# Patient Record
Sex: FEMALE | Race: BLACK OR AFRICAN AMERICAN | NOT HISPANIC OR LATINO | Employment: OTHER | URBAN - METROPOLITAN AREA
[De-identification: names, ages, dates, MRNs, and addresses within clinical notes are randomized per-mention and may not be internally consistent; named-entity substitution may affect disease eponyms.]

---

## 2022-05-10 ENCOUNTER — HOSPITAL ENCOUNTER (INPATIENT)
Facility: HOSPITAL | Age: 77
LOS: 3 days | Discharge: HOME HEALTH | DRG: 291 | End: 2022-05-13
Attending: EMERGENCY MEDICINE | Admitting: HOSPITALIST
Payer: MEDICARE

## 2022-05-10 DIAGNOSIS — I50.9 CHF (CONGESTIVE HEART FAILURE): ICD-10-CM

## 2022-05-10 DIAGNOSIS — Z86.79 HISTORY OF CONGESTIVE HEART FAILURE: ICD-10-CM

## 2022-05-10 DIAGNOSIS — J81.0 ACUTE PULMONARY EDEMA: ICD-10-CM

## 2022-05-10 DIAGNOSIS — R06.02 SHORTNESS OF BREATH: ICD-10-CM

## 2022-05-10 DIAGNOSIS — R53.81 DEBILITY: ICD-10-CM

## 2022-05-10 DIAGNOSIS — R06.02 SOB (SHORTNESS OF BREATH): ICD-10-CM

## 2022-05-10 DIAGNOSIS — I50.43 ACUTE ON CHRONIC COMBINED SYSTOLIC AND DIASTOLIC CONGESTIVE HEART FAILURE: Primary | ICD-10-CM

## 2022-05-10 PROBLEM — D64.9 ANEMIA: Status: ACTIVE | Noted: 2022-05-10

## 2022-05-10 PROBLEM — N17.9 ACUTE RENAL FAILURE: Status: ACTIVE | Noted: 2022-05-10

## 2022-05-10 PROBLEM — D69.6 THROMBOCYTOPENIA: Status: ACTIVE | Noted: 2022-05-10

## 2022-05-10 LAB
ALBUMIN SERPL BCP-MCNC: 4.1 G/DL (ref 3.5–5.2)
ALP SERPL-CCNC: 87 U/L (ref 55–135)
ALT SERPL W/O P-5'-P-CCNC: 7 U/L (ref 10–44)
ANION GAP SERPL CALC-SCNC: 15 MMOL/L (ref 8–16)
AST SERPL-CCNC: 13 U/L (ref 10–40)
BACTERIA #/AREA URNS HPF: NORMAL /HPF
BASOPHILS # BLD AUTO: 0.02 K/UL (ref 0–0.2)
BASOPHILS NFR BLD: 0.4 % (ref 0–1.9)
BILIRUB SERPL-MCNC: 0.4 MG/DL (ref 0.1–1)
BILIRUB UR QL STRIP: NEGATIVE
BNP SERPL-MCNC: 780 PG/ML (ref 0–99)
BUN SERPL-MCNC: 42 MG/DL (ref 8–23)
CALCIUM SERPL-MCNC: 9.2 MG/DL (ref 8.7–10.5)
CHLORIDE SERPL-SCNC: 104 MMOL/L (ref 95–110)
CLARITY UR: CLEAR
CO2 SERPL-SCNC: 22 MMOL/L (ref 23–29)
COLOR UR: COLORLESS
CREAT SERPL-MCNC: 2.3 MG/DL (ref 0.5–1.4)
CTP QC/QA: YES
CTP QC/QA: YES
DIFFERENTIAL METHOD: ABNORMAL
EOSINOPHIL # BLD AUTO: 0.1 K/UL (ref 0–0.5)
EOSINOPHIL NFR BLD: 1.6 % (ref 0–8)
ERYTHROCYTE [DISTWIDTH] IN BLOOD BY AUTOMATED COUNT: 17.2 % (ref 11.5–14.5)
EST. GFR  (AFRICAN AMERICAN): 23 ML/MIN/1.73 M^2
EST. GFR  (NON AFRICAN AMERICAN): 20 ML/MIN/1.73 M^2
GLUCOSE SERPL-MCNC: 202 MG/DL (ref 70–110)
GLUCOSE UR QL STRIP: ABNORMAL
HCT VFR BLD AUTO: 35.2 % (ref 37–48.5)
HGB BLD-MCNC: 10.6 G/DL (ref 12–16)
HGB UR QL STRIP: NEGATIVE
IMM GRANULOCYTES # BLD AUTO: 0.02 K/UL (ref 0–0.04)
IMM GRANULOCYTES NFR BLD AUTO: 0.4 % (ref 0–0.5)
INR PPP: 1 (ref 0.8–1.2)
KETONES UR QL STRIP: NEGATIVE
LEUKOCYTE ESTERASE UR QL STRIP: NEGATIVE
LIPASE SERPL-CCNC: 78 U/L (ref 4–60)
LYMPHOCYTES # BLD AUTO: 2.1 K/UL (ref 1–4.8)
LYMPHOCYTES NFR BLD: 36.2 % (ref 18–48)
MAGNESIUM SERPL-MCNC: 2.1 MG/DL (ref 1.6–2.6)
MCH RBC QN AUTO: 26.6 PG (ref 27–31)
MCHC RBC AUTO-ENTMCNC: 30.1 G/DL (ref 32–36)
MCV RBC AUTO: 88 FL (ref 82–98)
MICROSCOPIC COMMENT: NORMAL
MONOCYTES # BLD AUTO: 0.5 K/UL (ref 0.3–1)
MONOCYTES NFR BLD: 8.1 % (ref 4–15)
NEUTROPHILS # BLD AUTO: 3 K/UL (ref 1.8–7.7)
NEUTROPHILS NFR BLD: 53.3 % (ref 38–73)
NITRITE UR QL STRIP: NEGATIVE
NRBC BLD-RTO: 0 /100 WBC
PH UR STRIP: 5 [PH] (ref 5–8)
PLATELET # BLD AUTO: 136 K/UL (ref 150–450)
PMV BLD AUTO: 13.8 FL (ref 9.2–12.9)
POC MOLECULAR INFLUENZA A AGN: NEGATIVE
POC MOLECULAR INFLUENZA B AGN: NEGATIVE
POTASSIUM SERPL-SCNC: 4.5 MMOL/L (ref 3.5–5.1)
PROT SERPL-MCNC: 7.9 G/DL (ref 6–8.4)
PROT UR QL STRIP: NEGATIVE
PROTHROMBIN TIME: 10.6 SEC (ref 9–12.5)
RBC # BLD AUTO: 3.98 M/UL (ref 4–5.4)
SARS-COV-2 RDRP RESP QL NAA+PROBE: NEGATIVE
SODIUM SERPL-SCNC: 141 MMOL/L (ref 136–145)
SP GR UR STRIP: 1.01 (ref 1–1.03)
TROPONIN I SERPL DL<=0.01 NG/ML-MCNC: 0.03 NG/ML (ref 0–0.03)
URN SPEC COLLECT METH UR: ABNORMAL
UROBILINOGEN UR STRIP-ACNC: NEGATIVE EU/DL
WBC # BLD AUTO: 5.66 K/UL (ref 3.9–12.7)
YEAST URNS QL MICRO: NORMAL

## 2022-05-10 PROCEDURE — 12000002 HC ACUTE/MED SURGE SEMI-PRIVATE ROOM

## 2022-05-10 PROCEDURE — 93010 EKG 12-LEAD: ICD-10-PCS | Mod: ,,, | Performed by: INTERNAL MEDICINE

## 2022-05-10 PROCEDURE — 63600175 PHARM REV CODE 636 W HCPCS: Performed by: EMERGENCY MEDICINE

## 2022-05-10 PROCEDURE — 84300 ASSAY OF URINE SODIUM: CPT | Performed by: EMERGENCY MEDICINE

## 2022-05-10 PROCEDURE — 83735 ASSAY OF MAGNESIUM: CPT | Performed by: EMERGENCY MEDICINE

## 2022-05-10 PROCEDURE — 99291 CRITICAL CARE FIRST HOUR: CPT

## 2022-05-10 PROCEDURE — 93005 ELECTROCARDIOGRAM TRACING: CPT

## 2022-05-10 PROCEDURE — 84540 ASSAY OF URINE/UREA-N: CPT | Performed by: EMERGENCY MEDICINE

## 2022-05-10 PROCEDURE — 85610 PROTHROMBIN TIME: CPT | Performed by: EMERGENCY MEDICINE

## 2022-05-10 PROCEDURE — 80053 COMPREHEN METABOLIC PANEL: CPT | Performed by: EMERGENCY MEDICINE

## 2022-05-10 PROCEDURE — 93010 ELECTROCARDIOGRAM REPORT: CPT | Mod: ,,, | Performed by: INTERNAL MEDICINE

## 2022-05-10 PROCEDURE — 81000 URINALYSIS NONAUTO W/SCOPE: CPT | Mod: 91 | Performed by: EMERGENCY MEDICINE

## 2022-05-10 PROCEDURE — 83935 ASSAY OF URINE OSMOLALITY: CPT | Performed by: EMERGENCY MEDICINE

## 2022-05-10 PROCEDURE — 84484 ASSAY OF TROPONIN QUANT: CPT | Performed by: EMERGENCY MEDICINE

## 2022-05-10 PROCEDURE — 83880 ASSAY OF NATRIURETIC PEPTIDE: CPT | Performed by: EMERGENCY MEDICINE

## 2022-05-10 PROCEDURE — 81000 URINALYSIS NONAUTO W/SCOPE: CPT | Performed by: EMERGENCY MEDICINE

## 2022-05-10 PROCEDURE — 83930 ASSAY OF BLOOD OSMOLALITY: CPT | Performed by: EMERGENCY MEDICINE

## 2022-05-10 PROCEDURE — 83690 ASSAY OF LIPASE: CPT | Performed by: EMERGENCY MEDICINE

## 2022-05-10 PROCEDURE — 82570 ASSAY OF URINE CREATININE: CPT | Performed by: EMERGENCY MEDICINE

## 2022-05-10 PROCEDURE — 83036 HEMOGLOBIN GLYCOSYLATED A1C: CPT | Performed by: EMERGENCY MEDICINE

## 2022-05-10 PROCEDURE — U0002 COVID-19 LAB TEST NON-CDC: HCPCS | Performed by: EMERGENCY MEDICINE

## 2022-05-10 PROCEDURE — 85025 COMPLETE CBC W/AUTO DIFF WBC: CPT | Performed by: EMERGENCY MEDICINE

## 2022-05-10 RX ORDER — ALLOPURINOL 300 MG/1
300 TABLET ORAL DAILY
COMMUNITY

## 2022-05-10 RX ORDER — FUROSEMIDE 10 MG/ML
40 INJECTION INTRAMUSCULAR; INTRAVENOUS
Status: DISCONTINUED | OUTPATIENT
Start: 2022-05-11 | End: 2022-05-10

## 2022-05-10 RX ORDER — PANTOPRAZOLE SODIUM 40 MG/1
40 TABLET, DELAYED RELEASE ORAL NIGHTLY
COMMUNITY

## 2022-05-10 RX ORDER — FUROSEMIDE 40 MG/1
60 TABLET ORAL DAILY
COMMUNITY

## 2022-05-10 RX ORDER — FUROSEMIDE 10 MG/ML
60 INJECTION INTRAMUSCULAR; INTRAVENOUS
Status: DISCONTINUED | OUTPATIENT
Start: 2022-05-11 | End: 2022-05-11

## 2022-05-10 RX ORDER — DAPAGLIFLOZIN 10 MG/1
10 TABLET, FILM COATED ORAL DAILY
Status: ON HOLD | COMMUNITY
End: 2022-05-13 | Stop reason: HOSPADM

## 2022-05-10 RX ORDER — LOPERAMIDE HYDROCHLORIDE 2 MG/1
2 CAPSULE ORAL 4 TIMES DAILY PRN
COMMUNITY

## 2022-05-10 RX ORDER — TRAZODONE HYDROCHLORIDE 50 MG/1
50 TABLET ORAL NIGHTLY
COMMUNITY

## 2022-05-10 RX ORDER — ATORVASTATIN CALCIUM 20 MG/1
20 TABLET, FILM COATED ORAL NIGHTLY
COMMUNITY

## 2022-05-10 RX ORDER — VITAMIN E 268 MG
400 CAPSULE ORAL DAILY
COMMUNITY

## 2022-05-10 RX ORDER — ENOXAPARIN SODIUM 100 MG/ML
30 INJECTION SUBCUTANEOUS
Status: DISCONTINUED | OUTPATIENT
Start: 2022-05-11 | End: 2022-05-13 | Stop reason: HOSPADM

## 2022-05-10 RX ORDER — PANTOPRAZOLE SODIUM 40 MG/10ML
40 INJECTION, POWDER, LYOPHILIZED, FOR SOLUTION INTRAVENOUS DAILY
Status: DISCONTINUED | OUTPATIENT
Start: 2022-05-11 | End: 2022-05-13 | Stop reason: HOSPADM

## 2022-05-10 RX ORDER — SODIUM CHLORIDE 0.9 % (FLUSH) 0.9 %
10 SYRINGE (ML) INJECTION
Status: DISCONTINUED | OUTPATIENT
Start: 2022-05-11 | End: 2022-05-13 | Stop reason: HOSPADM

## 2022-05-10 RX ORDER — ASPIRIN 81 MG/1
81 TABLET ORAL DAILY
COMMUNITY

## 2022-05-10 RX ORDER — LINAGLIPTIN 5 MG/1
5 TABLET, FILM COATED ORAL EVERY MORNING
COMMUNITY

## 2022-05-10 RX ORDER — TRAMADOL HYDROCHLORIDE 50 MG/1
50 TABLET ORAL EVERY 6 HOURS PRN
Status: DISCONTINUED | OUTPATIENT
Start: 2022-05-11 | End: 2022-05-13 | Stop reason: HOSPADM

## 2022-05-10 RX ORDER — CARVEDILOL 3.12 MG/1
3.12 TABLET ORAL 2 TIMES DAILY WITH MEALS
COMMUNITY

## 2022-05-10 RX ORDER — ASPIRIN 81 MG/1
81 TABLET ORAL DAILY
Status: DISCONTINUED | OUTPATIENT
Start: 2022-05-11 | End: 2022-05-11

## 2022-05-10 RX ORDER — LANOLIN ALCOHOL/MO/W.PET/CERES
400 CREAM (GRAM) TOPICAL DAILY
COMMUNITY

## 2022-05-10 RX ORDER — FUROSEMIDE 10 MG/ML
80 INJECTION INTRAMUSCULAR; INTRAVENOUS
Status: COMPLETED | OUTPATIENT
Start: 2022-05-10 | End: 2022-05-10

## 2022-05-10 RX ORDER — SACUBITRIL AND VALSARTAN 24; 26 MG/1; MG/1
1 TABLET, FILM COATED ORAL DAILY
Status: ON HOLD | COMMUNITY
End: 2022-05-13 | Stop reason: HOSPADM

## 2022-05-10 RX ORDER — TRAMADOL HYDROCHLORIDE 50 MG/1
50 TABLET ORAL EVERY 6 HOURS PRN
COMMUNITY

## 2022-05-10 RX ORDER — CARVEDILOL 3.12 MG/1
3.12 TABLET ORAL 2 TIMES DAILY WITH MEALS
Status: DISCONTINUED | OUTPATIENT
Start: 2022-05-11 | End: 2022-05-13 | Stop reason: HOSPADM

## 2022-05-10 RX ORDER — TRAZODONE HYDROCHLORIDE 50 MG/1
50 TABLET ORAL NIGHTLY
Status: DISCONTINUED | OUTPATIENT
Start: 2022-05-11 | End: 2022-05-13 | Stop reason: HOSPADM

## 2022-05-10 RX ORDER — MORPHINE SULFATE 4 MG/ML
4 INJECTION, SOLUTION INTRAMUSCULAR; INTRAVENOUS EVERY 4 HOURS PRN
Status: DISCONTINUED | OUTPATIENT
Start: 2022-05-11 | End: 2022-05-13 | Stop reason: HOSPADM

## 2022-05-10 RX ADMIN — FUROSEMIDE 80 MG: 10 INJECTION, SOLUTION INTRAMUSCULAR; INTRAVENOUS at 09:05

## 2022-05-11 PROBLEM — R53.81 DEBILITY: Status: ACTIVE | Noted: 2022-05-11

## 2022-05-11 PROBLEM — Z79.4 TYPE 2 DIABETES MELLITUS WITH KIDNEY COMPLICATION, WITH LONG-TERM CURRENT USE OF INSULIN: Status: ACTIVE | Noted: 2022-05-11

## 2022-05-11 PROBLEM — E66.9 CLASS 1 OBESITY IN ADULT: Status: ACTIVE | Noted: 2022-05-11

## 2022-05-11 PROBLEM — E11.29 TYPE 2 DIABETES MELLITUS WITH KIDNEY COMPLICATION, WITH LONG-TERM CURRENT USE OF INSULIN: Status: ACTIVE | Noted: 2022-05-11

## 2022-05-11 PROBLEM — R79.89 TROPONIN LEVEL ELEVATED: Status: ACTIVE | Noted: 2022-05-11

## 2022-05-11 PROBLEM — J96.91 RESPIRATORY FAILURE WITH HYPOXIA: Status: ACTIVE | Noted: 2022-05-11

## 2022-05-11 PROBLEM — I10 ESSENTIAL HYPERTENSION: Status: ACTIVE | Noted: 2022-05-11

## 2022-05-11 LAB
ANION GAP SERPL CALC-SCNC: 10 MMOL/L (ref 8–16)
AORTIC ROOT ANNULUS: 3.19 CM
AORTIC VALVE CUSP SEPERATION: 1.56 CM
ASCENDING AORTA: 3.14 CM
AV INDEX (PROSTH): 0.39
AV MEAN GRADIENT: 5 MMHG
AV PEAK GRADIENT: 7 MMHG
AV VALVE AREA: 1.47 CM2
AV VELOCITY RATIO: 0.43
BACTERIA #/AREA URNS HPF: NORMAL /HPF
BASOPHILS # BLD AUTO: 0.02 K/UL (ref 0–0.2)
BASOPHILS NFR BLD: 0.3 % (ref 0–1.9)
BILIRUB UR QL STRIP: NEGATIVE
BSA FOR ECHO PROCEDURE: 2.01 M2
BUN SERPL-MCNC: 41 MG/DL (ref 8–23)
CALCIUM SERPL-MCNC: 9.4 MG/DL (ref 8.7–10.5)
CHLORIDE SERPL-SCNC: 104 MMOL/L (ref 95–110)
CLARITY UR: CLEAR
CO2 SERPL-SCNC: 30 MMOL/L (ref 23–29)
COLOR UR: COLORLESS
CREAT SERPL-MCNC: 2.2 MG/DL (ref 0.5–1.4)
CREAT UR-MCNC: 11.3 MG/DL (ref 15–325)
CV ECHO LV RWT: 0.48 CM
DIFFERENTIAL METHOD: ABNORMAL
DOP CALC AO PEAK VEL: 1.33 M/S
DOP CALC AO VTI: 25.43 CM
DOP CALC LVOT AREA: 3.8 CM2
DOP CALC LVOT DIAMETER: 2.19 CM
DOP CALC LVOT PEAK VEL: 0.57 M/S
DOP CALC LVOT STROKE VOLUME: 37.27 CM3
DOP CALCLVOT PEAK VEL VTI: 9.9 CM
E WAVE DECELERATION TIME: 175.44 MSEC
E/A RATIO: 0.71
E/E' RATIO: 24.29 M/S
ECHO LV POSTERIOR WALL: 1.4 CM (ref 0.6–1.1)
EJECTION FRACTION: 40 %
EOSINOPHIL # BLD AUTO: 0 K/UL (ref 0–0.5)
EOSINOPHIL NFR BLD: 0.3 % (ref 0–8)
EOSINOPHIL URNS QL WRIGHT STN: ABNORMAL
ERYTHROCYTE [DISTWIDTH] IN BLOOD BY AUTOMATED COUNT: 17 % (ref 11.5–14.5)
EST. GFR  (AFRICAN AMERICAN): 25 ML/MIN/1.73 M^2
EST. GFR  (NON AFRICAN AMERICAN): 21 ML/MIN/1.73 M^2
ESTIMATED AVG GLUCOSE: 151 MG/DL (ref 68–131)
FERRITIN SERPL-MCNC: 46 NG/ML (ref 20–300)
FRACTIONAL SHORTENING: 10 % (ref 28–44)
GLUCOSE SERPL-MCNC: 133 MG/DL (ref 70–110)
GLUCOSE UR QL STRIP: ABNORMAL
HBA1C MFR BLD: 6.9 % (ref 4–5.6)
HCT VFR BLD AUTO: 33.7 % (ref 37–48.5)
HGB BLD-MCNC: 10.2 G/DL (ref 12–16)
HGB UR QL STRIP: NEGATIVE
HYALINE CASTS #/AREA URNS LPF: 1 /LPF
IMM GRANULOCYTES # BLD AUTO: 0.03 K/UL (ref 0–0.04)
IMM GRANULOCYTES NFR BLD AUTO: 0.5 % (ref 0–0.5)
INTERVENTRICULAR SEPTUM: 1.43 CM (ref 0.6–1.1)
IRON SERPL-MCNC: 59 UG/DL (ref 30–160)
IVRT: 129.4 MSEC
KETONES UR QL STRIP: NEGATIVE
LA MAJOR: 6.44 CM
LA MINOR: 6.34 CM
LA WIDTH: 5.02 CM
LEFT ATRIUM SIZE: 3.96 CM
LEFT ATRIUM VOLUME INDEX: 55.1 ML/M2
LEFT ATRIUM VOLUME: 107.97 CM3
LEFT INTERNAL DIMENSION IN SYSTOLE: 5.27 CM (ref 2.1–4)
LEFT VENTRICLE DIASTOLIC VOLUME INDEX: 87.56 ML/M2
LEFT VENTRICLE DIASTOLIC VOLUME: 171.61 ML
LEFT VENTRICLE MASS INDEX: 195 G/M2
LEFT VENTRICLE SYSTOLIC VOLUME INDEX: 68.1 ML/M2
LEFT VENTRICLE SYSTOLIC VOLUME: 133.52 ML
LEFT VENTRICULAR INTERNAL DIMENSION IN DIASTOLE: 5.88 CM (ref 3.5–6)
LEFT VENTRICULAR MASS: 381.22 G
LEUKOCYTE ESTERASE UR QL STRIP: NEGATIVE
LV LATERAL E/E' RATIO: 21.25 M/S
LV SEPTAL E/E' RATIO: 28.33 M/S
LYMPHOCYTES # BLD AUTO: 1.5 K/UL (ref 1–4.8)
LYMPHOCYTES NFR BLD: 26 % (ref 18–48)
MCH RBC QN AUTO: 26.8 PG (ref 27–31)
MCHC RBC AUTO-ENTMCNC: 30.3 G/DL (ref 32–36)
MCV RBC AUTO: 89 FL (ref 82–98)
MICROSCOPIC COMMENT: NORMAL
MONOCYTES # BLD AUTO: 0.6 K/UL (ref 0.3–1)
MONOCYTES NFR BLD: 9.6 % (ref 4–15)
MV PEAK A VEL: 1.2 M/S
MV PEAK E VEL: 0.85 M/S
MV STENOSIS PRESSURE HALF TIME: 50.88 MS
MV VALVE AREA P 1/2 METHOD: 4.32 CM2
NEUTROPHILS # BLD AUTO: 3.6 K/UL (ref 1.8–7.7)
NEUTROPHILS NFR BLD: 63.3 % (ref 38–73)
NITRITE UR QL STRIP: NEGATIVE
NRBC BLD-RTO: 0 /100 WBC
OSMOLALITY SERPL: 323 MOSM/KG (ref 275–295)
OSMOLALITY UR: 311 MOSM/KG (ref 50–1200)
PH UR STRIP: 7 [PH] (ref 5–8)
PISA TR MAX VEL: 2.42 M/S
PLATELET # BLD AUTO: 145 K/UL (ref 150–450)
PMV BLD AUTO: 13 FL (ref 9.2–12.9)
POCT GLUCOSE: 126 MG/DL (ref 70–110)
POCT GLUCOSE: 141 MG/DL (ref 70–110)
POCT GLUCOSE: 153 MG/DL (ref 70–110)
POCT GLUCOSE: 160 MG/DL (ref 70–110)
POTASSIUM SERPL-SCNC: 4.6 MMOL/L (ref 3.5–5.1)
PROT UR QL STRIP: NEGATIVE
PV PEAK VELOCITY: 1.01 CM/S
RA MAJOR: 5.38 CM
RA PRESSURE: 3 MMHG
RA WIDTH: 4.66 CM
RBC # BLD AUTO: 3.8 M/UL (ref 4–5.4)
RIGHT VENTRICULAR END-DIASTOLIC DIMENSION: 3.79 CM
RV TISSUE DOPPLER FREE WALL SYSTOLIC VELOCITY 1 (APICAL 4 CHAMBER VIEW): 10.99 CM/S
SATURATED IRON: 17 % (ref 20–50)
SINUS: 3.5 CM
SODIUM SERPL-SCNC: 144 MMOL/L (ref 136–145)
SODIUM UR-SCNC: 118 MMOL/L (ref 20–250)
SP GR UR STRIP: 1 (ref 1–1.03)
STJ: 3.25 CM
TDI LATERAL: 0.04 M/S
TDI SEPTAL: 0.03 M/S
TDI: 0.04 M/S
TOTAL IRON BINDING CAPACITY: 345 UG/DL (ref 250–450)
TR MAX PG: 23 MMHG
TRANSFERRIN SERPL-MCNC: 233 MG/DL (ref 200–375)
TRICUSPID ANNULAR PLANE SYSTOLIC EXCURSION: 2.4 CM
TROPONIN I SERPL DL<=0.01 NG/ML-MCNC: 0.09 NG/ML (ref 0–0.03)
TROPONIN I SERPL DL<=0.01 NG/ML-MCNC: 0.1 NG/ML (ref 0–0.03)
TV REST PULMONARY ARTERY PRESSURE: 26 MMHG
URN SPEC COLLECT METH UR: ABNORMAL
UROBILINOGEN UR STRIP-ACNC: NEGATIVE EU/DL
UUN UR-MCNC: 99 MG/DL (ref 140–1050)
WBC # BLD AUTO: 5.72 K/UL (ref 3.9–12.7)
YEAST URNS QL MICRO: NORMAL

## 2022-05-11 PROCEDURE — 94760 N-INVAS EAR/PLS OXIMETRY 1: CPT

## 2022-05-11 PROCEDURE — 21400001 HC TELEMETRY ROOM

## 2022-05-11 PROCEDURE — 87205 SMEAR GRAM STAIN: CPT | Performed by: HOSPITALIST

## 2022-05-11 PROCEDURE — 84484 ASSAY OF TROPONIN QUANT: CPT | Mod: 91 | Performed by: HOSPITALIST

## 2022-05-11 PROCEDURE — 25000003 PHARM REV CODE 250: Performed by: EMERGENCY MEDICINE

## 2022-05-11 PROCEDURE — 27000190 HC CPAP FULL FACE MASK W/VALVE

## 2022-05-11 PROCEDURE — 63600175 PHARM REV CODE 636 W HCPCS: Performed by: EMERGENCY MEDICINE

## 2022-05-11 PROCEDURE — 94660 CPAP INITIATION&MGMT: CPT

## 2022-05-11 PROCEDURE — 85025 COMPLETE CBC W/AUTO DIFF WBC: CPT | Performed by: EMERGENCY MEDICINE

## 2022-05-11 PROCEDURE — 84466 ASSAY OF TRANSFERRIN: CPT | Performed by: HOSPITALIST

## 2022-05-11 PROCEDURE — 25000003 PHARM REV CODE 250: Performed by: HOSPITALIST

## 2022-05-11 PROCEDURE — 80048 BASIC METABOLIC PNL TOTAL CA: CPT | Performed by: EMERGENCY MEDICINE

## 2022-05-11 PROCEDURE — 36415 COLL VENOUS BLD VENIPUNCTURE: CPT | Performed by: EMERGENCY MEDICINE

## 2022-05-11 PROCEDURE — 82728 ASSAY OF FERRITIN: CPT | Performed by: HOSPITALIST

## 2022-05-11 PROCEDURE — C9113 INJ PANTOPRAZOLE SODIUM, VIA: HCPCS | Performed by: EMERGENCY MEDICINE

## 2022-05-11 PROCEDURE — 27000221 HC OXYGEN, UP TO 24 HOURS

## 2022-05-11 PROCEDURE — 63600175 PHARM REV CODE 636 W HCPCS: Performed by: HOSPITALIST

## 2022-05-11 PROCEDURE — 84484 ASSAY OF TROPONIN QUANT: CPT | Performed by: EMERGENCY MEDICINE

## 2022-05-11 PROCEDURE — 99900035 HC TECH TIME PER 15 MIN (STAT)

## 2022-05-11 RX ORDER — LOPERAMIDE HYDROCHLORIDE 2 MG/1
2 CAPSULE ORAL 4 TIMES DAILY PRN
Status: DISCONTINUED | OUTPATIENT
Start: 2022-05-11 | End: 2022-05-13 | Stop reason: HOSPADM

## 2022-05-11 RX ORDER — ATORVASTATIN CALCIUM 10 MG/1
20 TABLET, FILM COATED ORAL NIGHTLY
Status: DISCONTINUED | OUTPATIENT
Start: 2022-05-11 | End: 2022-05-13 | Stop reason: HOSPADM

## 2022-05-11 RX ORDER — TALC
6 POWDER (GRAM) TOPICAL NIGHTLY PRN
Status: DISCONTINUED | OUTPATIENT
Start: 2022-05-11 | End: 2022-05-13 | Stop reason: HOSPADM

## 2022-05-11 RX ORDER — FUROSEMIDE 10 MG/ML
60 INJECTION INTRAMUSCULAR; INTRAVENOUS
Status: DISCONTINUED | OUTPATIENT
Start: 2022-05-11 | End: 2022-05-12

## 2022-05-11 RX ORDER — ACETAMINOPHEN 325 MG/1
650 TABLET ORAL EVERY 6 HOURS PRN
Status: DISCONTINUED | OUTPATIENT
Start: 2022-05-11 | End: 2022-05-13 | Stop reason: HOSPADM

## 2022-05-11 RX ORDER — IBUPROFEN 200 MG
16 TABLET ORAL
Status: DISCONTINUED | OUTPATIENT
Start: 2022-05-11 | End: 2022-05-13 | Stop reason: HOSPADM

## 2022-05-11 RX ORDER — INSULIN ASPART 100 [IU]/ML
0-5 INJECTION, SOLUTION INTRAVENOUS; SUBCUTANEOUS
Status: DISCONTINUED | OUTPATIENT
Start: 2022-05-11 | End: 2022-05-13 | Stop reason: HOSPADM

## 2022-05-11 RX ORDER — MUPIROCIN 20 MG/G
OINTMENT TOPICAL 2 TIMES DAILY
Status: DISCONTINUED | OUTPATIENT
Start: 2022-05-11 | End: 2022-05-13 | Stop reason: HOSPADM

## 2022-05-11 RX ORDER — NITROGLYCERIN 0.4 MG/1
0.4 TABLET SUBLINGUAL EVERY 5 MIN PRN
Status: DISCONTINUED | OUTPATIENT
Start: 2022-05-11 | End: 2022-05-13 | Stop reason: HOSPADM

## 2022-05-11 RX ORDER — ONDANSETRON 2 MG/ML
4 INJECTION INTRAMUSCULAR; INTRAVENOUS EVERY 4 HOURS PRN
Status: DISCONTINUED | OUTPATIENT
Start: 2022-05-11 | End: 2022-05-13 | Stop reason: HOSPADM

## 2022-05-11 RX ORDER — GLUCAGON 1 MG
1 KIT INJECTION
Status: DISCONTINUED | OUTPATIENT
Start: 2022-05-11 | End: 2022-05-13 | Stop reason: HOSPADM

## 2022-05-11 RX ORDER — ASPIRIN 325 MG
325 TABLET, DELAYED RELEASE (ENTERIC COATED) ORAL DAILY
Status: DISCONTINUED | OUTPATIENT
Start: 2022-05-11 | End: 2022-05-13 | Stop reason: HOSPADM

## 2022-05-11 RX ORDER — IBUPROFEN 200 MG
24 TABLET ORAL
Status: DISCONTINUED | OUTPATIENT
Start: 2022-05-11 | End: 2022-05-13 | Stop reason: HOSPADM

## 2022-05-11 RX ADMIN — ATORVASTATIN CALCIUM 20 MG: 10 TABLET, FILM COATED ORAL at 08:05

## 2022-05-11 RX ADMIN — CARVEDILOL 3.12 MG: 3.12 TABLET, FILM COATED ORAL at 09:05

## 2022-05-11 RX ADMIN — ENOXAPARIN SODIUM 30 MG: 30 INJECTION SUBCUTANEOUS at 09:05

## 2022-05-11 RX ADMIN — FUROSEMIDE 60 MG: 10 INJECTION, SOLUTION INTRAMUSCULAR; INTRAVENOUS at 06:05

## 2022-05-11 RX ADMIN — NITROGLYCERIN 1 INCH: 20 OINTMENT TOPICAL at 06:05

## 2022-05-11 RX ADMIN — Medication 6 MG: at 09:05

## 2022-05-11 RX ADMIN — ASPIRIN 325 MG: 325 TABLET, COATED ORAL at 09:05

## 2022-05-11 RX ADMIN — CARVEDILOL 3.12 MG: 3.12 TABLET, FILM COATED ORAL at 05:05

## 2022-05-11 RX ADMIN — NITROGLYCERIN 1 INCH: 20 OINTMENT TOPICAL at 12:05

## 2022-05-11 RX ADMIN — TRAZODONE HYDROCHLORIDE 50 MG: 50 TABLET ORAL at 08:05

## 2022-05-11 RX ADMIN — PANTOPRAZOLE SODIUM 40 MG: 40 INJECTION, POWDER, FOR SOLUTION INTRAVENOUS at 09:05

## 2022-05-11 RX ADMIN — FUROSEMIDE 60 MG: 10 INJECTION, SOLUTION INTRAMUSCULAR; INTRAVENOUS at 05:05

## 2022-05-11 RX ADMIN — MUPIROCIN: 20 OINTMENT TOPICAL at 08:05

## 2022-05-11 NOTE — ASSESSMENT & PLAN NOTE
-Check A1c  -Home med list includes dapaglifozin, aspart insulin, detemir insulin and tradjenta.  -Treat with ssi ac/hs for now.

## 2022-05-11 NOTE — ASSESSMENT & PLAN NOTE
-Hb 10.6 on admit and normocytic  -No bleeding  -Check iron, ferritin, b12 and folate  -Repeat cbc in AM

## 2022-05-11 NOTE — PLAN OF CARE
[facility] - Telemetry  Initial Discharge Assessment       Primary Care Provider: Angel Lopez MD    Admission Diagnosis: Shortness of breath [R06.02]  Acute pulmonary edema [J81.0]  SOB (shortness of breath) [R06.02]  History of congestive heart failure [Z86.79]    Admission Date: 5/10/2022  Expected Discharge Date:     Discharge Barriers Identified: None    Payor: MEDICARE / Plan: MEDICARE PART A & B / Product Type: Government /     Extended Emergency Contact Information  Primary Emergency Contact: Martin Guerin   [facility]  Home Phone: [phone]  Relation: Son  Secondary Emergency Contact: TruongSarahi  Mobile Phone: [phone]  Relation: Daughter   needed? No    Discharge Plan A: Home with family  Discharge Plan B: Other      [id number] - [place], [place] - [address]  [address]  [place] [address]  Phone: [phone] Fax: [phone]      Initial Assessment (most recent)       Adult Discharge Assessment - 05/11/22 1527          Discharge Assessment    Assessment Type Discharge Planning Assessment     Confirmed/corrected address, phone number and insurance Yes     Confirmed Demographics Correct on Facesheet     Source of Information patient     If unable to respond/provide information was family/caregiver contacted? No Contact Information Available     When was your last doctors appointment? --   Patient stated 2 months ago.    Communicated TRACEY with patient/caregiver Date not available/Unable to determine     Reason For Admission Shortness of Breath     Lives With alone;child(keanu), adult     Facility Arrived From: Home     Do you expect to return to your current living situation? Yes     Do you have help at home or someone to help you manage your care at home? No     Prior to hospitilization cognitive status: Alert/Oriented     Current cognitive status: Alert/Oriented     Equipment Currently  Used at Home oxygen;nebulizer;cane, straight     Readmission within 30 days? No     Patient currently being followed by outpatient case management? No     Do you currently have service(s) that help you manage your care at home? No     Do you take prescription medications? Yes     Do you have prescription coverage? Yes     Coverage Medicare and Medicaid     Do you have any problems affording any of your prescribed medications? No     Is the patient taking medications as prescribed? yes     Who is going to help you get home at discharge? Martin Guerin (Son)   [phone]     How do you get to doctors appointments? family or friend will provide     Are you on dialysis? No     Do you take coumadin? No     Discharge Plan A Home with family     Discharge Plan B Other     DME Needed Upon Discharge  other (see comments)   TBD    Discharge Plan discussed with: Patient     Discharge Barriers Identified None        Relationship/Environment    Name(s) of Who Lives With Patient Martin Guerin (Son)   [phone]                     Patient stated her appointments have to be worked around her daughter's work schedule.

## 2022-05-11 NOTE — ED PROVIDER NOTES
"Encounter Date: 5/10/2022       History     Chief Complaint   Patient presents with    Shortness of Breath     Pt. Arrrived via EMS from home with C/C SOB x30 minutes.  Pt. Has Hx of COPD & CHF, Is speaking in complete sentences and denies pain at triage. States she has occasional "Nausea".  Has N/C @ 2lpm 100% Spo2.     HPI   This 76-year-old white female presents to the emergency room with a history of congestive heart failure and COPD.  The patient denies chest pain pressure tightness.  She has had some cough.  There is no sputum production or fever chills.  She has generalized abdominal pain.  She denies painful urination vomiting or diarrhea.  All of her symptoms started at about 6:00 p.m..  She is visiting from [place].  Review of patient's allergies indicates:  No Known Allergies  No past medical history on file.  No past surgical history on file.  No family history on file.     Review of Systems  The patient was questioned specifically with regard to the following.  General: Fever, chills, sweats. Neuro: Headache. Eyes: eye problems. ENT: Ear pain, sore throat. Cardiovascular: Chest pain. Respiratory: Cough, shortness of breath. Gastrointestinal: Abdominal pain, vomiting, diarrhea. Genitourinary: Painful urination.  Musculoskeletal: Arm and leg problems. Skin: Rash.  The review of systems was negative except for the following:  Shortness of breath, cough, generalized abdominal pain.  No chest pain pressure tightness or fever.  Physical Exam     Initial Vitals [05/10/22 2043]   BP Pulse Resp Temp SpO2   118/72 102 20 98.9 °F (37.2 °C) 100 %      MAP       --         Physical Exam  The patient was examined specifically for the following:   General:No significant distress, Good color, Warm and dry. Head and neck:Scalp atraumatic, Neck supple. Neurological:Appropriate conversation, Gross motor deficits. Eyes:Conjugate gaze, Clear corneas. ENT: No epistaxis. Cardiac: Regular rate and rhythm, Grossly normal " heart tones. Pulmonary: Wheezing, Rales. Gastrointestinal: Abdominal tenderness, Abdominal distention. Musculoskeletal: Extremity deformity, Apparent pain with range of motion of the joints. Skin: Rash.   The findings on examination were normal except for the following:  The patient appears to be dyspneic.  Her heart rate is 102. Oxygen saturations are 100% on 100% non-rebreather.  The heart tones are normal the patient has regular rate rhythm.  The abdomen is soft.  ED Course   Critical Care    Date/Time: 5/10/2022 10:53 PM  Performed by: Yandel Anderson MD  Authorized by: Yandel Anderson MD   Direct patient critical care time: 22 minutes  Additional history critical care time: 11 minutes  Ordering / reviewing critical care time: 11 minutes  Documentation critical care time: 11 minutes  Consulting other physicians critical care time: 11 minutes  Total critical care time (exclusive of procedural time) : 66 minutes  Critical care time was exclusive of separately billable procedures and treating other patients and teaching time.  Critical care was necessary to treat or prevent imminent or life-threatening deterioration of the following conditions: respiratory failure and cardiac failure.  Critical care was time spent personally by me on the following activities: development of treatment plan with patient or surrogate, discussions with primary provider, evaluation of patient's response to treatment, examination of patient, obtaining history from patient or surrogate, ordering and performing treatments and interventions, ordering and review of laboratory studies, ordering and review of radiographic studies, pulse oximetry, re-evaluation of patient's condition and review of old charts.        Labs Reviewed   COMPREHENSIVE METABOLIC PANEL - Abnormal; Notable for the following components:       Result Value    CO2 22 (*)     Glucose 202 (*)     BUN 42 (*)     Creatinine 2.3 (*)     ALT 7 (*)     eGFR if   23 (*)     eGFR if non  20 (*)     All other components within normal limits   LIPASE - Abnormal; Notable for the following components:    Lipase 78 (*)     All other components within normal limits   TROPONIN I - Abnormal; Notable for the following components:    Troponin I 0.032 (*)     All other components within normal limits   B-TYPE NATRIURETIC PEPTIDE - Abnormal; Notable for the following components:     (*)     All other components within normal limits   CBC W/ AUTO DIFFERENTIAL - Abnormal; Notable for the following components:    RBC 3.98 (*)     Hemoglobin 10.6 (*)     Hematocrit 35.2 (*)     MCH 26.6 (*)     MCHC 30.1 (*)     RDW 17.2 (*)     Platelets 136 (*)     MPV 13.8 (*)     All other components within normal limits   URINALYSIS, REFLEX TO URINE CULTURE - Abnormal; Notable for the following components:    Color, UA Colorless (*)     Glucose, UA 4+ (*)     All other components within normal limits    Narrative:     Specimen Source->Urine   PROTIME-INR   URINALYSIS MICROSCOPIC    Narrative:     Specimen Source->Urine   SARS-COV-2 RDRP GENE   POCT INFLUENZA A/B MOLECULAR     EKG Readings: (Independently Interpreted)   This patient is in a sinus tachycardia with a heart rate of 113.  There are nonspecific ST segment and T-wave changes.  The patient has normal axis.  There are low voltage QRS complexes.       Imaging Results          X-Ray Chest AP Portable (Final result)  Result time 05/10/22 22:12:13    Final result by Maame Valverde MD (05/10/22 22:12:13)                 Impression:      Cardiomegaly.  Bilateral airspace opacities.  Findings may represent CHF and pulmonary edema.  Pneumonic infiltrates may have a similar appearance.  Recommend clinical correlation.      Electronically signed by: Maame Valverde  Date:    05/10/2022  Time:    22:12             Narrative:    EXAMINATION:  AP PORTABLE CHEST    CLINICAL HISTORY:  chest pain;    TECHNIQUE:  AP portable chest  radiograph was submitted.    COMPARISON:  11/26/2007    FINDINGS:  There is a left subclavian pacer.  AP portable chest radiograph demonstrates mild enlargement the cardiac silhouette.  There are bilateral airspace opacities.  There is no pneumothorax.  There is no significant pleural effusion detected.  There is no pneumothorax.                              Medical decision making:  Given the above this patient presents to emergency with shortness of breath.  The patient has a history of congestive heart failure.  Chest x-ray reveals changes consistent with acute pulmonary edema.  Patient is visiting from out of town we have limited information about the patient's baseline medical condition.  Her BUN is 40 year her creatinine is 2.3.  This is likely from diuretic therapy.  Patient has a borderline elevated troponin and BNP is 780. I believe this patient has acute pulmonary edema from congestive heart failure.  I believe she will benefit from diuresis.         Medications   nitroGLYCERIN 2% TD oint ointment 1 inch (has no administration in time range)   furosemide injection 40 mg (has no administration in time range)   furosemide injection 80 mg (80 mg Intravenous Given 5/10/22 8423)                          Clinical Impression:   Final diagnoses:  [R06.02] Shortness of breath  [J81.0] Acute pulmonary edema (Primary)  [Z86.79] History of congestive heart failure          ED Disposition Condition    Admit               Yandel Anderson MD  05/10/22 9691

## 2022-05-11 NOTE — ASSESSMENT & PLAN NOTE
-Patient with Hypoxic Respiratory failure which is Chronic.  She is on home oxygen at 2 LPM at night only.  -Signs/symptoms of respiratory failure include- tachypnea.   -Contributing diagnoses includes - CHF and COPD   -Labs and images were reviewed. Patient Has not had a recent ABG.   -Will treat underlying causes and adjust management of respiratory failure as follows-   -Treat CHF as above.  -Continue supplemental O2.

## 2022-05-11 NOTE — ED NOTES
Pt was 94% on 4L NC and working to breathe. She requested facemask. nonrebreather placed on pt and sat now 99%

## 2022-05-11 NOTE — PROGRESS NOTES
[facility] Medicine  Progress Note    Patient Name: Perry Guerin  MRN: [id number]  Patient Class: IP- Inpatient   Admission Date: 5/10/2022  Length of Stay: 1 days  Attending Physician: Yandel Aviles MD  Primary Care Provider: Primary Doctor No        Subjective:     Principal Problem:Acute CHF        HPI:  No notes on file    Overview/Hospital Course:  No notes on file    Interval History: No acute events overnight.  Feels much better.  Denies both chest and abdominal pain now.  Notes that primary pain was abdominal, intermittent and gassy.  Denies edema.  Notes that she uses nasal canula O2 2L only at night.  All questions answered and patient had no further complaints.    Review of Systems   Constitutional:  Negative for activity change, chills, diaphoresis and fatigue.   HENT:  Negative for congestion, drooling and hearing loss.    Eyes:  Negative for discharge.   Respiratory:  Positive for shortness of breath. Negative for apnea, chest tightness and wheezing.    Cardiovascular:  Negative for palpitations and leg swelling.   Gastrointestinal:  Positive for abdominal pain. Negative for abdominal distention, constipation and diarrhea.   Endocrine: Negative.    Genitourinary:  Negative for difficulty urinating.   Musculoskeletal:  Negative for arthralgias and gait problem.   Skin:  Negative for rash.   Neurological:  Positive for weakness. Negative for seizures, light-headedness and numbness.   Hematological:  Negative for adenopathy.   Psychiatric/Behavioral:  Negative for agitation and behavioral problems.    Objective:     Vital Signs (Most Recent):  Temp: 98.1 °F (36.7 °C) (05/11/22 1205)  Pulse: 83 (05/11/22 1205)  Resp: 18 (05/11/22 1205)  BP: 108/68 (05/11/22 1205)  SpO2: 100 % (05/11/22 1205) Vital Signs (24h Range):  Temp:  [98.1 °F (36.7 °C)-98.9 °F (37.2 °C)] 98.1 °F (36.7 °C)  Pulse:  [] 83  Resp:  [17-30] 18  SpO2:  [93 %-100 %] 100 %  BP: (108-138)/(55-85) 108/68      Weight: 87 kg (191 lb 12.8 oz)  Body mass index is 30.96 kg/m².    Intake/Output Summary (Last 24 hours) at 5/11/2022 1324  Last data filed at 5/11/2022 0715  Gross per 24 hour   Intake 240 ml   Output 2675 ml   Net -2435 ml      Physical Exam  Vitals and nursing note reviewed.   Constitutional:       General: She is not in acute distress.     Appearance: She is well-developed. She is not ill-appearing, toxic-appearing or diaphoretic.   HENT:      Head: Normocephalic and atraumatic.      Right Ear: External ear normal.      Left Ear: External ear normal.      Nose: Nose normal.      Mouth/Throat:      Mouth: Mucous membranes are moist.   Eyes:      Extraocular Movements: Extraocular movements intact.      Conjunctiva/sclera: Conjunctivae normal.   Cardiovascular:      Rate and Rhythm: Normal rate and regular rhythm.      Heart sounds: Normal heart sounds.      Comments: AICD noted in left chest  Pulmonary:      Effort: Pulmonary effort is normal. No respiratory distress.      Breath sounds: Normal breath sounds. No wheezing or rales.   Abdominal:      General: Bowel sounds are normal. There is no distension.      Palpations: Abdomen is soft.      Tenderness: There is no abdominal tenderness.   Musculoskeletal:         General: Normal range of motion.      Cervical back: Normal range of motion. No rigidity.      Right lower leg: No edema.      Left lower leg: No edema.   Skin:     General: Skin is warm and dry.   Neurological:      Mental Status: She is alert and oriented to person, place, and time.      Cranial Nerves: No cranial nerve deficit.      Coordination: Coordination normal.      Comments: Diffuse weakness - non-focal   Psychiatric:         Behavior: Behavior normal.       Significant Labs: All pertinent labs within the past 24 hours have been reviewed.    Significant Imaging: I have reviewed all pertinent imaging results/findings within the past 24 hours.      Assessment/Plan:      * Acute  CHF  -Admitted to inpatient status  -On admit noted elevated BNP and pulmonary edema on CXR with some shortness of breath  -BNP elevated at 780  -AICD in place - presumed to be systolic chf.  -Strict ins/outs and daily weights  -Await echo - being done today.  -Holding home entresto due to renal dysfunction  -Continue home coreg  -Diurese with IV lasix.    Acute renal failure  -Cr 2.23 on admit  -Baseline Cr unclear, but she does have a nephrologist in [place]  -Cr 1.03 12/2013 - last data available to me.  -Avoid nephrotoxic agents and renally dose meds  -IESHA without evidence of hydronephrosis.  -FEUrea 48% - suggesting intrinsic renal disease  -Holding home entresto for now.  -Fluid overloaded on admit - diuresing with IV lasix  -Arriaza placed in ER  -Repeat BMP in AM.    Troponin level elevated  -Troponin 0.03--0.10 --0.08  -Had chest vs abdominal pain prior to admit.  Patient reports it was much more abdominal pain to me.  Presently pain free with NTG patch in place.  -EKG showed no ST change and TWI in V5/V6 which were also present in 2007.  -Echo ordered - await results  -Suspect this was demand ischemia in setting of chf exacerbation and renal dysfunction (acute vs chronic?)    Thrombocytopenia  -Platelets 136 on admit and 145 now  -Unclear if new or chronic.  -Check folic acid and b12      Debility  -Consult PT/OT    Respiratory failure with hypoxia  -Patient with Hypoxic Respiratory failure which is Chronic.  She is on home oxygen at 2 LPM at night only.  -Signs/symptoms of respiratory failure include- tachypnea.   -Contributing diagnoses includes - CHF and COPD   -Labs and images were reviewed. Patient Has not had a recent ABG.   -Will treat underlying causes and adjust management of respiratory failure as follows-   -Treat CHF as above.  -Continue supplemental O2.    Class 1 obesity in adult  Body mass index is 30.96 kg/m². Morbid obesity complicates all aspects of disease management from diagnostic  modalities to treatment. Weight loss encouraged and health benefits explained to patient.    Essential hypertension  -Well controlled  -Continue home coreg  -Holding entresto due to renal dysfunction.    Type 2 diabetes mellitus with kidney complication, with long-term current use of insulin  -Check A1c  -Home med list includes dapaglifozin, aspart insulin, detemir insulin and tradjenta.  -Treat with ssi ac/hs for now.    Anemia  -Hb 10.6 on admit and normocytic  -No bleeding  -Check iron, ferritin, b12 and folate  -Repeat cbc in AM      VTE Risk Mitigation (From admission, onward)         Ordered     enoxaparin injection 30 mg  Every 24 hours (non-standard times)         05/10/22 2325     IP VTE HIGH RISK PATIENT  Once         05/10/22 2325     Place sequential compression device  Until discontinued         05/10/22 2325                Discharge Planning   TRACEY:      Code Status: Full Code   Is the patient medically ready for discharge?:     Reason for patient still in hospital (select all that apply): Treatment and PT / OT recommendations                     Yandel Aviles MD  Department of Hospital Medicine   [facility] - Telemetry

## 2022-05-11 NOTE — ASSESSMENT & PLAN NOTE
Body mass index is 30.96 kg/m². Morbid obesity complicates all aspects of disease management from diagnostic modalities to treatment. Weight loss encouraged and health benefits explained to patient.

## 2022-05-11 NOTE — ASSESSMENT & PLAN NOTE
-Cr 2.23 on admit  -Baseline Cr unclear, but she does have a nephrologist in [place]  -Cr 1.03 12/2013 - last data available to me.  -Avoid nephrotoxic agents and renally dose meds  -IESHA without evidence of hydronephrosis.  -FEUrea 48% - suggesting intrinsic renal disease  -Holding home entresto for now.  -Fluid overloaded on admit - diuresing with IV lasix  -Arriaza placed in ER  -Repeat BMP in AM.

## 2022-05-11 NOTE — PT/OT/SLP PROGRESS
Physical Therapy      Patient Name:  Perry Guerin   MRN:  [id number]    Patient not seen today secondary to Off the floor for procedure/surgery. Will follow-up .

## 2022-05-11 NOTE — ED NOTES
Pt to ED via EMS from home with c/o SOB while at rest x 1 hour. Hx of CHF.Denies n/v but reports some light-headedness

## 2022-05-11 NOTE — ASSESSMENT & PLAN NOTE
Check urine electrolytes. Diuresis  to augment the urine output and relieve symptoms. Monitor UOP, serum creatinine, electrolytes, albumin, and measures of fluid balance. Consider renal US and nephrology consult if no improvement. No need for HD at this time

## 2022-05-11 NOTE — ED NOTES
"Pt asked for tramadol on several occasions and when it was brought to her, she stated that she didn't hurt as much and wanted to "wait until I need it."  "

## 2022-05-11 NOTE — ASSESSMENT & PLAN NOTE
-Admitted to inpatient status  -On admit noted elevated BNP and pulmonary edema on CXR with some shortness of breath  -BNP elevated at 780  -AICD in place - presumed to be systolic chf.  -Strict ins/outs and daily weights  -Await echo - being done today.  -Holding home entresto due to renal dysfunction  -Continue home coreg  -Diurese with IV lasix.

## 2022-05-11 NOTE — SUBJECTIVE & OBJECTIVE
Interval History: No acute events overnight.  Feels much better.  Denies both chest and abdominal pain now.  Notes that primary pain was abdominal, intermittent and gassy.  Denies edema.  Notes that she uses nasal canula O2 2L only at night.  All questions answered and patient had no further complaints.    Review of Systems   Constitutional:  Negative for activity change, chills, diaphoresis and fatigue.   HENT:  Negative for congestion, drooling and hearing loss.    Eyes:  Negative for discharge.   Respiratory:  Positive for shortness of breath. Negative for apnea, chest tightness and wheezing.    Cardiovascular:  Negative for palpitations and leg swelling.   Gastrointestinal:  Positive for abdominal pain. Negative for abdominal distention, constipation and diarrhea.   Endocrine: Negative.    Genitourinary:  Negative for difficulty urinating.   Musculoskeletal:  Negative for arthralgias and gait problem.   Skin:  Negative for rash.   Neurological:  Positive for weakness. Negative for seizures, light-headedness and numbness.   Hematological:  Negative for adenopathy.   Psychiatric/Behavioral:  Negative for agitation and behavioral problems.    Objective:     Vital Signs (Most Recent):  Temp: 98.1 °F (36.7 °C) (05/11/22 1205)  Pulse: 83 (05/11/22 1205)  Resp: 18 (05/11/22 1205)  BP: 108/68 (05/11/22 1205)  SpO2: 100 % (05/11/22 1205) Vital Signs (24h Range):  Temp:  [98.1 °F (36.7 °C)-98.9 °F (37.2 °C)] 98.1 °F (36.7 °C)  Pulse:  [] 83  Resp:  [17-30] 18  SpO2:  [93 %-100 %] 100 %  BP: (108-138)/(55-85) 108/68     Weight: 87 kg (191 lb 12.8 oz)  Body mass index is 30.96 kg/m².    Intake/Output Summary (Last 24 hours) at 5/11/2022 1324  Last data filed at 5/11/2022 0715  Gross per 24 hour   Intake 240 ml   Output 2675 ml   Net -2435 ml      Physical Exam  Vitals and nursing note reviewed.   Constitutional:       General: She is not in acute distress.     Appearance: She is well-developed. She is not  ill-appearing, toxic-appearing or diaphoretic.   HENT:      Head: Normocephalic and atraumatic.      Right Ear: External ear normal.      Left Ear: External ear normal.      Nose: Nose normal.      Mouth/Throat:      Mouth: Mucous membranes are moist.   Eyes:      Extraocular Movements: Extraocular movements intact.      Conjunctiva/sclera: Conjunctivae normal.   Cardiovascular:      Rate and Rhythm: Normal rate and regular rhythm.      Heart sounds: Normal heart sounds.      Comments: AICD noted in left chest  Pulmonary:      Effort: Pulmonary effort is normal. No respiratory distress.      Breath sounds: Normal breath sounds. No wheezing or rales.   Abdominal:      General: Bowel sounds are normal. There is no distension.      Palpations: Abdomen is soft.      Tenderness: There is no abdominal tenderness.   Musculoskeletal:         General: Normal range of motion.      Cervical back: Normal range of motion. No rigidity.      Right lower leg: No edema.      Left lower leg: No edema.   Skin:     General: Skin is warm and dry.   Neurological:      Mental Status: She is alert and oriented to person, place, and time.      Cranial Nerves: No cranial nerve deficit.      Coordination: Coordination normal.      Comments: Diffuse weakness - non-focal   Psychiatric:         Behavior: Behavior normal.       Significant Labs: All pertinent labs within the past 24 hours have been reviewed.    Significant Imaging: I have reviewed all pertinent imaging results/findings within the past 24 hours.

## 2022-05-11 NOTE — H&P
"[facility] Emergency Dept  [facility] Medicine  History & Physical    Patient Name: Perry Guerin  MRN: [id number]  Patient Class: IP- Inpatient  Admission Date: 5/10/2022  Attending Physician: Yandel Aviles MD  Primary Care Provider: No primary care provider on file.         Patient information was obtained from past medical records and ER records. patient    Subjective:     Principal Problem:<principal problem not specified>    Chief Complaint:     Chief Complaint   Patient presents with    Shortness of Breath     Pt. Arrrived via EMS from home with C/C SOB x30 minutes.  Pt. Has Hx of COPD & CHF, Is speaking in complete sentences and denies pain at triage. States she has occasional "Nausea".  Has N/C @ 2lpm 100% Spo2.       HPI: 76 y.o. female PMHx CHF, COPD presents with SOB and chest pressure that worsened immediately PTA. States that symptoms feel tight. Symptoms associated with abdominal pain. Patient is visiting from out of town when she started to have symptoms st 6pm    ED course:  WBC normal. Hg 10 and Cr 2.3 up from 0.8 five years ago. Patient has elevated bnp (780) and tn (0.032). CXR shows evidence of pulm edema.  Admitted to Hospital Medicine for further evaluation    PMHx as abobe  No past surgical history on file.     No family history on file.  Review of patient's allergies indicates:  No Known Allergies       Medication List      ASK your doctor about these medications    allopurinoL 300 MG tablet  Commonly known as: ZYLOPRIM     aspirin 81 MG EC tablet  Commonly known as: ECOTRIN     atorvastatin 20 MG tablet  Commonly known as: LIPITOR     carvediloL 3.125 MG tablet  Commonly known as: COREG     ENTRESTO 24-26 mg per tablet  Generic drug: sacubitriL-valsartan     FARXIGA 10 mg tablet  Generic drug: dapagliflozin     furosemide 40 MG tablet  Commonly known as: LASIX     insulin detemir U-100 100 unit/mL injection  Commonly known as: Levemir     loperamide 2 mg capsule  Commonly known as: IMODIUM   " "  magnesium oxide 400 mg (241.3 mg magnesium) tablet  Commonly known as: MAG-OX     NOVOLOG FLEXPEN U-100 INSULIN SUBQ     pantoprazole 40 MG tablet  Commonly known as: PROTONIX     TRADJENTA 5 mg Tab tablet  Generic drug: linaGLIPtin     traMADoL 50 mg tablet  Commonly known as: ULTRAM     traZODone 50 MG tablet  Commonly known as: DESYREL     vitamin E 400 UNIT capsule                 Review of Systems as per [facility]  Review of Systems   Constitutional: Negative for chills, diaphoresis, fever and weight loss.   Respiratory: Positive for cough and shortness of breath. Negative for hemoptysis, sputum production and wheezing.    Cardiovascular: Positive for chest pain.   Gastrointestinal: Positive for abdominal pain. Negative for blood in stool, constipation, diarrhea, melena and vomiting.   Musculoskeletal: Positive for joint pain.   Neurological: Positive for dizziness. Negative for sensory change, speech change, focal weakness and loss of consciousness.   All other systems reviewed and are negative.        Physical Exam     ED Triage Vitals [05/10/22 2043]   Enc Vitals Group      /72      Pulse 102      Resp 20      Temp 98.9 °F (37.2 °C)      Temp src Oral      SpO2 100 %      Weight 170 lb      Height 5' 7"      Head Circumference       Peak Flow       Pain Score       Pain Loc       Pain Edu?       Excl. in GC?      Vitals:    05/10/22 2118 05/10/22 2153 05/10/22 2232 05/10/22 2247   BP: 134/78 132/85 124/82 127/78   BP Location:       Patient Position:       Pulse: (!) 112 110 102 102   Resp: (!) 28 (!) 25 (!) 24 (!) 25   Temp:       TempSrc:       SpO2: 100% 100% 100% 100%   Weight:       Height:              Physical Exam  Constitutional:       Interventions: She is not intubated.  Cardiovascular:      Rate and Rhythm: Regular rhythm. Tachycardia present.   Pulmonary:      Effort: Tachypnea present. No accessory muscle usage or prolonged expiration. She is not intubated.          Tests   Significant Labs " and/or Imaging: I have reviewed all pertinent results/findings within the past 24 hours.  Labs Reviewed   COMPREHENSIVE METABOLIC PANEL - Abnormal; Notable for the following components:       Result Value    CO2 22 (*)     Glucose 202 (*)     BUN 42 (*)     Creatinine 2.3 (*)     ALT 7 (*)     eGFR if  23 (*)     eGFR if non  20 (*)     All other components within normal limits   LIPASE - Abnormal; Notable for the following components:    Lipase 78 (*)     All other components within normal limits   TROPONIN I - Abnormal; Notable for the following components:    Troponin I 0.032 (*)     All other components within normal limits   B-TYPE NATRIURETIC PEPTIDE - Abnormal; Notable for the following components:     (*)     All other components within normal limits   CBC W/ AUTO DIFFERENTIAL - Abnormal; Notable for the following components:    RBC 3.98 (*)     Hemoglobin 10.6 (*)     Hematocrit 35.2 (*)     MCH 26.6 (*)     MCHC 30.1 (*)     RDW 17.2 (*)     Platelets 136 (*)     MPV 13.8 (*)     All other components within normal limits   URINALYSIS, REFLEX TO URINE CULTURE - Abnormal; Notable for the following components:    Color, UA Colorless (*)     Glucose, UA 4+ (*)     All other components within normal limits    Narrative:     Specimen Source->Urine   PROTIME-INR   URINALYSIS MICROSCOPIC    Narrative:     Specimen Source->Urine   MAGNESIUM   OSMOLALITY, SERUM   HEMOGLOBIN A1C   OCCULT BLOOD X 1, STOOL   URINALYSIS, REFLEX TO URINE CULTURE   CREATININE, URINE, RANDOM   UREA NITROGEN, URINE, RANDOM   OSMOLALITY, URINE RANDOM   SODIUM, URINE, RANDOM   MAGNESIUM   OSMOLALITY, SERUM   SARS-COV-2 RDRP GENE   POCT INFLUENZA A/B MOLECULAR     X-Ray Chest AP Portable   Final Result      Cardiomegaly.  Bilateral airspace opacities.  Findings may represent CHF and pulmonary edema.  Pneumonic infiltrates may have a similar appearance.  Recommend clinical correlation.          Electronically signed by: Maame Valverde   Date:    05/10/2022   Time:    22:12        No results found for this or any previous visit.  EKG    No STEMI  Sinus tachycardia withnHR 112   Premature ventricular complexes  Low voltage QRS  Septal infarct ,age undetermined  T wave abnormality, consider lateral ischemia  Abnormal ECG  When compared with ECG of 26-NOV-  Assessment/Plan:     Acute renal failure  Check urine electrolytes. Diuresis  to augment the urine output and relieve symptoms. Monitor UOP, serum creatinine, electrolytes, albumin, and measures of fluid balance. Consider renal US and nephrology consult if no improvement. No need for HD at this time    Thrombocytopenia  monitor      Anemia  Monitor H/H  Provision of supplemental oxygen by nasal cannula, NPO, IV access   - IV proton pump inhibitor (PPI)   Check occult stool       Acute CHF  Admitted to inpatient status  -On admit noted elevated BNP and pulmonary edema on CXR  -Aggressive diuresis  -Strict ins/outs and daily weights  - Check Echo  -Cardiology consulted and input appreciated  -Monitor on telemetry      VTE Risk Mitigation (From admission, onward)         Ordered     enoxaparin injection 30 mg  Every 24 hours (non-standard times)         05/10/22 2325     IP VTE HIGH RISK PATIENT  Once         05/10/22 2325     Place sequential compression device  Until discontinued         05/10/22 2325                   Von Barron MD  Department of Hospital Medicine   [facility] - Emergency Dept

## 2022-05-11 NOTE — ASSESSMENT & PLAN NOTE
Admitted to inpatient status  -On admit noted elevated BNP and pulmonary edema on CXR  -Aggressive diuresis  -Strict ins/outs and daily weights  - Check Echo  -Cardiology consulted and input appreciated  -Monitor on telemetry

## 2022-05-11 NOTE — ASSESSMENT & PLAN NOTE
Monitor H/H  Provision of supplemental oxygen by nasal cannula, NPO, IV access   - IV proton pump inhibitor (PPI)   Check occult stool

## 2022-05-11 NOTE — CONSULTS
Food & Nutrition  Education    Diet Education: Low Na, FR  Time Spent: 15 minutes  Learners: Patient      Nutrition Education provided with handouts:   Heart Failure, FR Nutrition Therapy  + Clinical Reference attachments to d/c documents    Comments: Discussed diet restrictions needed, Low Na, FR - pt said she follows low Na already at home and is compliant with her fluid pills (States takes double dose every other day per MD orders?) and usually weighs herself daily but has not been able to this week because she has been with her son. Provided hand out, pt verbalized understanding.      All questions and concerns answered. Dietitian's contact information provided.   Please Re-consult as needed  Thanks!

## 2022-05-12 PROBLEM — I50.43 ACUTE ON CHRONIC COMBINED SYSTOLIC AND DIASTOLIC CONGESTIVE HEART FAILURE: Status: ACTIVE | Noted: 2022-05-10

## 2022-05-12 LAB
ANION GAP SERPL CALC-SCNC: 10 MMOL/L (ref 8–16)
BASOPHILS # BLD AUTO: 0.01 K/UL (ref 0–0.2)
BASOPHILS NFR BLD: 0.2 % (ref 0–1.9)
BUN SERPL-MCNC: 51 MG/DL (ref 8–23)
CALCIUM SERPL-MCNC: 8.8 MG/DL (ref 8.7–10.5)
CHLORIDE SERPL-SCNC: 104 MMOL/L (ref 95–110)
CO2 SERPL-SCNC: 26 MMOL/L (ref 23–29)
CREAT SERPL-MCNC: 2.8 MG/DL (ref 0.5–1.4)
DIFFERENTIAL METHOD: ABNORMAL
EOSINOPHIL # BLD AUTO: 0.1 K/UL (ref 0–0.5)
EOSINOPHIL NFR BLD: 0.9 % (ref 0–8)
ERYTHROCYTE [DISTWIDTH] IN BLOOD BY AUTOMATED COUNT: 16.9 % (ref 11.5–14.5)
EST. GFR  (AFRICAN AMERICAN): 18 ML/MIN/1.73 M^2
EST. GFR  (NON AFRICAN AMERICAN): 16 ML/MIN/1.73 M^2
FOLATE SERPL-MCNC: 6.9 NG/ML (ref 4–24)
GLUCOSE SERPL-MCNC: 134 MG/DL (ref 70–110)
HCT VFR BLD AUTO: 30.6 % (ref 37–48.5)
HGB BLD-MCNC: 9.3 G/DL (ref 12–16)
IMM GRANULOCYTES # BLD AUTO: 0.02 K/UL (ref 0–0.04)
IMM GRANULOCYTES NFR BLD AUTO: 0.4 % (ref 0–0.5)
LYMPHOCYTES # BLD AUTO: 1.8 K/UL (ref 1–4.8)
LYMPHOCYTES NFR BLD: 34.1 % (ref 18–48)
MCH RBC QN AUTO: 26.9 PG (ref 27–31)
MCHC RBC AUTO-ENTMCNC: 30.4 G/DL (ref 32–36)
MCV RBC AUTO: 88 FL (ref 82–98)
MONOCYTES # BLD AUTO: 0.8 K/UL (ref 0.3–1)
MONOCYTES NFR BLD: 15.1 % (ref 4–15)
NEUTROPHILS # BLD AUTO: 2.6 K/UL (ref 1.8–7.7)
NEUTROPHILS NFR BLD: 49.3 % (ref 38–73)
NRBC BLD-RTO: 0 /100 WBC
PLATELET # BLD AUTO: 119 K/UL (ref 150–450)
PMV BLD AUTO: 14 FL (ref 9.2–12.9)
POCT GLUCOSE: 139 MG/DL (ref 70–110)
POCT GLUCOSE: 150 MG/DL (ref 70–110)
POCT GLUCOSE: 152 MG/DL (ref 70–110)
POCT GLUCOSE: 159 MG/DL (ref 70–110)
POTASSIUM SERPL-SCNC: 3.9 MMOL/L (ref 3.5–5.1)
RBC # BLD AUTO: 3.46 M/UL (ref 4–5.4)
SODIUM SERPL-SCNC: 140 MMOL/L (ref 136–145)
VIT B12 SERPL-MCNC: 633 PG/ML (ref 210–950)
WBC # BLD AUTO: 5.36 K/UL (ref 3.9–12.7)

## 2022-05-12 PROCEDURE — 97161 PT EVAL LOW COMPLEX 20 MIN: CPT

## 2022-05-12 PROCEDURE — 25000003 PHARM REV CODE 250: Performed by: HOSPITALIST

## 2022-05-12 PROCEDURE — C9113 INJ PANTOPRAZOLE SODIUM, VIA: HCPCS | Performed by: EMERGENCY MEDICINE

## 2022-05-12 PROCEDURE — 97535 SELF CARE MNGMENT TRAINING: CPT

## 2022-05-12 PROCEDURE — 36415 COLL VENOUS BLD VENIPUNCTURE: CPT | Performed by: HOSPITALIST

## 2022-05-12 PROCEDURE — 63600175 PHARM REV CODE 636 W HCPCS: Performed by: EMERGENCY MEDICINE

## 2022-05-12 PROCEDURE — 63600175 PHARM REV CODE 636 W HCPCS: Performed by: HOSPITALIST

## 2022-05-12 PROCEDURE — 25000003 PHARM REV CODE 250: Performed by: EMERGENCY MEDICINE

## 2022-05-12 PROCEDURE — 97110 THERAPEUTIC EXERCISES: CPT

## 2022-05-12 PROCEDURE — 85025 COMPLETE CBC W/AUTO DIFF WBC: CPT | Performed by: EMERGENCY MEDICINE

## 2022-05-12 PROCEDURE — 21400001 HC TELEMETRY ROOM

## 2022-05-12 PROCEDURE — 80048 BASIC METABOLIC PNL TOTAL CA: CPT | Performed by: EMERGENCY MEDICINE

## 2022-05-12 PROCEDURE — 94760 N-INVAS EAR/PLS OXIMETRY 1: CPT

## 2022-05-12 PROCEDURE — 82746 ASSAY OF FOLIC ACID SERUM: CPT | Performed by: HOSPITALIST

## 2022-05-12 PROCEDURE — 99900035 HC TECH TIME PER 15 MIN (STAT)

## 2022-05-12 PROCEDURE — 82607 VITAMIN B-12: CPT | Performed by: HOSPITALIST

## 2022-05-12 PROCEDURE — 97165 OT EVAL LOW COMPLEX 30 MIN: CPT

## 2022-05-12 RX ORDER — LANOLIN ALCOHOL/MO/W.PET/CERES
1 CREAM (GRAM) TOPICAL DAILY
Status: DISCONTINUED | OUTPATIENT
Start: 2022-05-12 | End: 2022-05-13 | Stop reason: HOSPADM

## 2022-05-12 RX ADMIN — ENOXAPARIN SODIUM 30 MG: 30 INJECTION SUBCUTANEOUS at 05:05

## 2022-05-12 RX ADMIN — MUPIROCIN: 20 OINTMENT TOPICAL at 09:05

## 2022-05-12 RX ADMIN — ASPIRIN 325 MG: 325 TABLET, COATED ORAL at 09:05

## 2022-05-12 RX ADMIN — Medication 6 MG: at 09:05

## 2022-05-12 RX ADMIN — FUROSEMIDE 60 MG: 10 INJECTION, SOLUTION INTRAMUSCULAR; INTRAVENOUS at 05:05

## 2022-05-12 RX ADMIN — ATORVASTATIN CALCIUM 20 MG: 10 TABLET, FILM COATED ORAL at 09:05

## 2022-05-12 RX ADMIN — CARVEDILOL 3.12 MG: 3.12 TABLET, FILM COATED ORAL at 09:05

## 2022-05-12 RX ADMIN — PANTOPRAZOLE SODIUM 40 MG: 40 INJECTION, POWDER, FOR SOLUTION INTRAVENOUS at 09:05

## 2022-05-12 RX ADMIN — FERROUS SULFATE TAB 325 MG (65 MG ELEMENTAL FE) 1 EACH: 325 (65 FE) TAB at 09:05

## 2022-05-12 RX ADMIN — TRAZODONE HYDROCHLORIDE 50 MG: 50 TABLET ORAL at 09:05

## 2022-05-12 NOTE — PLAN OF CARE
Problem: Infection  Goal: Absence of Infection Signs and Symptoms  Outcome: Ongoing, Progressing     Problem: Adult Inpatient Plan of Care  Goal: Plan of Care Review  Outcome: Ongoing, Progressing  Goal: Patient-Specific Goal (Individualized)  Outcome: Ongoing, Progressing  Goal: Absence of Hospital-Acquired Illness or Injury  Outcome: Ongoing, Progressing  Goal: Optimal Comfort and Wellbeing  Outcome: Ongoing, Progressing  Goal: Readiness for Transition of Care  Outcome: Ongoing, Progressing     Problem: Oral Intake Inadequate (Acute Kidney Injury/Impairment)  Goal: Optimal Nutrition Intake  Outcome: Ongoing, Progressing     Problem: Diabetes Comorbidity  Goal: Blood Glucose Level Within Targeted Range  Outcome: Ongoing, Progressing  Remain SR  with BBC on telemetry monitor. PRN medication effective for sleep, CPAP placed while asleep.  2L NC when awake.  Explained plan of care, verbalized understanding. Educated pt .on new medicine . No injury during shift, Side rails up x 2, call light by bedside.

## 2022-05-12 NOTE — PROGRESS NOTES
[facility] Medicine  Progress Note    Patient Name: Perry Guerin  MRN: [id number]  Patient Class: IP- Inpatient   Admission Date: 5/10/2022  Length of Stay: 2 days  Attending Physician: Yandel Aviles MD  Primary Care Provider: Angel Lopez MD        Subjective:     Principal Problem:Acute on chronic combined systolic and diastolic congestive heart failure        HPI:  No notes on file    Overview/Hospital Course:  No notes on file    Interval History: No acute events overnight.  Did not tolerate our cpap - stated it was too tight and very uncomfortable.  Feeling better overall.  Discussed with patient's daughters on speaker phone at bedside.  No chest or abdominal pain.  All questions answered and patient had no further complaints.    Review of Systems   Constitutional:  Negative for activity change, chills, diaphoresis and fatigue.   HENT:  Negative for congestion, drooling and hearing loss.    Eyes:  Negative for discharge.   Respiratory:  Negative for apnea, chest tightness, shortness of breath and wheezing.    Cardiovascular:  Negative for palpitations and leg swelling.   Gastrointestinal:  Negative for abdominal distention, abdominal pain, constipation and diarrhea.   Endocrine: Negative.    Genitourinary:  Negative for difficulty urinating.   Musculoskeletal:  Negative for arthralgias and gait problem.   Skin:  Negative for rash.   Neurological:  Positive for weakness. Negative for seizures, light-headedness and numbness.   Hematological:  Negative for adenopathy.   Psychiatric/Behavioral:  Negative for agitation and behavioral problems.    Objective:     Vital Signs (Most Recent):  Temp: 97.8 °F (36.6 °C) (05/12/22 1132)  Pulse: 83 (05/12/22 1132)  Resp: 20 (05/12/22 1132)  BP: (!) 88/52 (05/12/22 1132)  SpO2: 96 % (05/12/22 1132) Vital Signs (24h Range):  Temp:  [97.6 °F (36.4 °C)-98.3 °F (36.8 °C)] 97.8 °F (36.6 °C)  Pulse:  [74-86] 83  Resp:  [16-21] 20  SpO2:  [95 %-99 %] 96  %  BP: ()/(52-72) 88/52     Weight: 88 kg (194 lb 0.1 oz)  Body mass index is 31.31 kg/m².    Intake/Output Summary (Last 24 hours) at 5/12/2022 1205  Last data filed at 5/12/2022 0900  Gross per 24 hour   Intake 460 ml   Output 1700 ml   Net -1240 ml        Physical Exam  Vitals and nursing note reviewed.   Constitutional:       General: She is not in acute distress.     Appearance: She is well-developed. She is not ill-appearing, toxic-appearing or diaphoretic.   HENT:      Head: Normocephalic and atraumatic.      Right Ear: External ear normal.      Left Ear: External ear normal.      Nose: Nose normal.      Mouth/Throat:      Mouth: Mucous membranes are moist.   Eyes:      Extraocular Movements: Extraocular movements intact.      Conjunctiva/sclera: Conjunctivae normal.   Cardiovascular:      Rate and Rhythm: Normal rate and regular rhythm.      Heart sounds: Normal heart sounds.      Comments: AICD noted in left chest  Pulmonary:      Effort: Pulmonary effort is normal. No respiratory distress.      Breath sounds: Normal breath sounds. No wheezing or rales.   Abdominal:      General: Bowel sounds are normal. There is no distension.      Palpations: Abdomen is soft.      Tenderness: There is no abdominal tenderness.   Musculoskeletal:         General: Normal range of motion.      Cervical back: Normal range of motion. No rigidity.      Right lower leg: No edema.      Left lower leg: No edema.   Skin:     General: Skin is warm and dry.   Neurological:      Mental Status: She is alert and oriented to person, place, and time.      Cranial Nerves: No cranial nerve deficit.      Coordination: Coordination normal.      Comments: Diffuse weakness - non-focal   Psychiatric:         Behavior: Behavior normal.       Significant Labs: All pertinent labs within the past 24 hours have been reviewed.    Significant Imaging: I have reviewed all pertinent imaging results/findings within the past 24  hours.      Assessment/Plan:      * Acute on chronic combined systolic and diastolic congestive heart failure  -Admitted to inpatient status  -On admit noted elevated BNP and pulmonary edema on CXR with some shortness of breath  -BNP elevated at 780  -AICD in place - presumed to be systolic chf.  -Strict ins/outs and daily weights  -Echo showed Ef 40%, grade I diastolic dysfunction, sever LAE  -Diuresed with IV lasix and 2.3L net negative.  -Holding home entresto and lasix (today)due to renal dysfunction  -Continue home coreg  -Possible discharge home tomorrow.    Acute renal failure  -On admit Cr 2.23 and now 2.8  -Baseline Cr per daughter is 2.2  -Avoid nephrotoxic agents and renally dose meds  -IESHA without evidence of hydronephrosis.  -FEUrea 48% - suggesting intrinsic renal disease  -Holding home entresto for now.  -Fluid overloaded on admit and diuresed with IV lasix.  Will hold lasix for now.  -Discontinue hernandez.  -Repeat BMP in AM.    Troponin level elevated  -Troponin 0.03--0.10 --0.08  -Had chest vs abdominal pain prior to admit.  Patient reports it was much more abdominal pain to me.  Presently pain free with NTG patch in place.  -EKG showed no ST change and TWI in V5/V6 which were also present in 2007.  -Echo reviewed as above - no segmental wall motion abnormalities  -Suspect this was demand ischemia in setting of chf exacerbation and renal dysfunction (acute vs chronic?)    Thrombocytopenia  -Platelets 136 on admit.  Now 119  -Unclear if new or chronic.  -No evidence of bleeding., but Hb dropped almost a gram  -Await folic acid and b12  -Repeat cbc in AM.    Debility  -Consult PT/OT    Respiratory failure with hypoxia  -Patient with Hypoxic Respiratory failure which is Chronic.  She is on home oxygen at 2 LPM at night only.  -Signs/symptoms of respiratory failure include- tachypnea.   -Contributing diagnoses includes - CHF and COPD   -Labs and images were reviewed. Patient Has not had a recent ABG.    -Will treat underlying causes and adjust management of respiratory failure as follows-   -Treat CHF as above.  -Continue supplemental O2.    Class 1 obesity in adult  Body mass index is 31.31 kg/m². Morbid obesity complicates all aspects of disease management from diagnostic modalities to treatment. Weight loss encouraged and health benefits explained to patient.    Essential hypertension  -BP low normal - would like to be a bit higher.  -Continue home coreg but place hold parameters to avoid hypotension.  -Holding entresto due to renal dysfunction.    Type 2 diabetes mellitus with kidney complication, with long-term current use of insulin  -A1c 6.9  -Home med list includes dapaglifozin, aspart insulin, detemir insulin and tradjenta.  -Treat with ssi ac/hs for now.    Anemia  -Hb 10.6 on admit and normocytic.  Now 9.3  -No bleeding  -Ferritin low but T-sat 17.1% - mixed picture with possible component of iron deficiency   -Await b12 and folate  -Repeat cbc in AM      VTE Risk Mitigation (From admission, onward)         Ordered     enoxaparin injection 30 mg  Every 24 hours (non-standard times)         05/10/22 2325     IP VTE HIGH RISK PATIENT  Once         05/10/22 2325     Place sequential compression device  Until discontinued         05/10/22 2325                Discharge Planning   TRACEY:      Code Status: Full Code   Is the patient medically ready for discharge?:     Reason for patient still in hospital (select all that apply): Treatment and PT / OT recommendations  Discharge Plan A: Home with family                  Yandel Aviles MD  Department of Hospital Medicine   [facility] - Telemetry

## 2022-05-12 NOTE — PT/OT/SLP EVAL
Occupational Therapy   Evaluation    Name: Perry Guerin  MRN: [id number]  Admitting Diagnosis:  Acute CHF  Recent Surgery: * No surgery found *      Recommendations:     Discharge Recommendations:  HH OT  Discharge Equipment Recommendations:  bedside commode, walker, rolling  Barriers to discharge:  None    Assessment:     Perry Guerin is a 76 y.o. female with a medical diagnosis of Acute CHF.   Performance deficits affecting function: weakness, impaired self care skills, impaired functional mobilty, gait instability, impaired balance, decreased upper extremity function, pain, impaired cardiopulmonary response to activity.    The patient was able to perform self care and functional mobility with SBA using a RW. The patient with c/o dizziness upon moving to sit on the EOB, but resolved after a brief rest. SPO2 ob 2l NC and RA was 97%, /65. The patient will benefit from HH OT to assess home safety.    Rehab Prognosis: Good; patient would benefit from acute skilled OT services to address these deficits and reach maximum level of function.       Plan:     Patient to be seen 3 x/week to address the above listed problems via self-care/home management, therapeutic activities, therapeutic exercises  · Plan of Care Expires: 05/26/22  · Plan of Care Reviewed with: patient    Subjective     Chief Complaint: Arriaza Catheter is uncomfortable  Patient/Family Comments/goals: agreeable to OT eval and sit in the chair    Occupational Profile:  Living Environment: alternates living with her 3 children. She currently lives with her son in a [facility] with 2-3 KIEL. One dtr lives in a 1st floor apt, other dtr has a 2 story house with a stair lift to the 2nd floor bedroom.  Previous level of function: Mod I amb using a cane, prn but repots using furniture for stability. The patient stands in a tub/shower to bathe. Mod I with dressing/bathing. No falls reported.  Roles and Routines: does not drive. The patient fixes her own  meals.  Equipment Used at Home:  cane, straight, rollator  Assistance upon Discharge: 2 dtrs and a son    Pain/Comfort:  · Pain Rating 1: 0/10  · Location 1: back  · Pain Addressed 1: Cessation of Activity, Reposition  · Pain Rating Post-Intervention 1: 4/10    Patients cultural, spiritual, Rastafarian conflicts given the current situation: no    Objective:     Communicated with: Man gonzalez prior to session.  Patient found HOB elevated with telemetry, hernandez catheter, peripheral IV upon OT entry to room.    General Precautions: Standard, fall   Orthopedic Precautions:N/A   Braces: N/A  Respiratory Status: Nasal cannula, flow 2 L/min    Occupational Performance:    Bed Mobility:    · Patient completed Scooting/Bridging with stand by assistance  · Patient completed Supine to Sit with stand by assistance, with side rail and HOB elevated    Functional Mobility/Transfers:  · Patient completed Sit <> Stand Transfer with stand by assistance  with  rolling walker   · Functional Mobility: The patient amb using a RW with SBA/CGA fro bed to sink to chair, with VC for RW placement at the sink.    Activities of Daily Living:  · Grooming: stand by assistance to brush dentures and wash hands and face while standing at the sinl  · Upper Body Dressing: contact guard assistance    · Toileting: declined to amb to the toilet 2* stating she needs to receive medicine from her nurse to have a BM (Sofiya present)    Cognitive/Visual Perceptual:  Cognitive/Psychosocial Skills:     -       Oriented to: Person, Place, Time and Situation   -       Follows Commands/attention:Follows multistep  commands  -       Communication: clear/fluent  -       Memory: No Deficits noted  -       Safety awareness/insight to disability: intact   -       Mood/Affect/Coping skills/emotional control: Appropriate to situation  Visual/Perceptual:      -Intact      Physical Exam:  Balance: -       good/fair+  Postural examination/scapula alignment:    -        Forward head  Skin integrity: Visible skin intact  Edema:  None noted  Sensation: -       Intact  Dominant hand: -       right  Upper Extremity Range of Motion:     -       Right Upper Extremity: WFL  -       Left Upper Extremity: WFL  Upper Extremity Strength:    -       Right Upper Extremity: WFL  -       Left Upper Extremity: WFL   Strength:    -       Right Upper Extremity: WFL  -       Left Upper Extremity: WFL except decreased AROM to left thumb   Fine Motor Coordination:    -       Intact    AMPAC 6 Click ADL:  AMPAC Total Score: 21    Treatment & Education:  · Participated in OT eval  · Educated the patient re: OT role and POC  · Monitored vitals with c/o dizziness while seated on the EOB, resolved after a brief rest. SPO2 : 2l NC and RA was 97%, /65.   · discussed DME needs and assistance available at home. The patient was educated re: bathroom safety and recommendations for a TTB and HH shower vs (S) to stand to bathe with a grab bar in the tub.   · Home safety/fall prevention handout was reviewed and given to the patient for reference. The patient will benefit from HH OT to assess home safety.  · Educated the patient re: benefits of sitting in the chair  Education:    Patient left up in chair with all lines intact, call button in reach and nurse notified    GOALS:   Multidisciplinary Problems     Occupational Therapy Goals        Problem: Occupational Therapy    Goal Priority Disciplines Outcome Interventions   Occupational Therapy Goal     OT, PT/OT Ongoing, Progressing    Description: Goals to be met by: 5/26/22    Patient will increase functional independence with ADLs by performing:    UE Dressing with Modified [place].  LE Dressing with Modified [place].  Grooming while standing at sink with Modified [place] and Assistive Devices as needed.  Toileting from toilet with Modified [place] and Assistive Devices as needed for hygiene and clothing management.   Supine to sit  with Modified [place].  Step transfer with Modified [place]  Toilet transfer to toilet with Modified [place].  Upper extremity exercise program x15 reps per handout, with independence.                     History:     Past Medical History:   Diagnosis Date    Back pain     Gout, unspecified     HTN (hypertension)     Type 2 diabetes mellitus without complications        No past surgical history on file.    Time Tracking:     OT Date of Treatment: 05/12/22  OT Start Time: 1027  OT Stop Time: 1107  OT Total Time (min): 40 min    Billable Minutes:Evaluation 15  Self Care/Home Management 25  Total Time 40    5/12/2022     119.9

## 2022-05-12 NOTE — ASSESSMENT & PLAN NOTE
-BP low normal - would like to be a bit higher.  -Continue home coreg but place hold parameters to avoid hypotension.  -Holding entresto due to renal dysfunction.

## 2022-05-12 NOTE — ASSESSMENT & PLAN NOTE
-Troponin 0.03--0.10 --0.08  -Had chest vs abdominal pain prior to admit.  Patient reports it was much more abdominal pain to me.  Presently pain free with NTG patch in place.  -EKG showed no ST change and TWI in V5/V6 which were also present in 2007.  -Echo reviewed as above - no segmental wall motion abnormalities  -Suspect this was demand ischemia in setting of chf exacerbation and renal dysfunction (acute vs chronic?)

## 2022-05-12 NOTE — NURSING
Daughter Alejandro Guerin Called  States mother uses CPAP at home Phone  notified remote ALEJANDRO

## 2022-05-12 NOTE — ASSESSMENT & PLAN NOTE
-Admitted to inpatient status  -On admit noted elevated BNP and pulmonary edema on CXR with some shortness of breath  -BNP elevated at 780  -AICD in place - presumed to be systolic chf.  -Strict ins/outs and daily weights  -Echo showed Ef 40%, grade I diastolic dysfunction, sever LAE  -Diuresed with IV lasix and 2.3L net negative.  -Holding home entresto and lasix (today)due to renal dysfunction  -Continue home coreg  -Possible discharge home tomorrow.

## 2022-05-12 NOTE — PLAN OF CARE
Problem: Physical Therapy  Goal: Physical Therapy Goal  Outcome: Adequate for Care Transition   Initial PT evaluation performed today.  Pt ok for D/C home with family from PT standpoint.  RW and HHPT recommended.  PT to sign off.  OK for OOB to chair and ambulate with nursing staff

## 2022-05-12 NOTE — ASSESSMENT & PLAN NOTE
-Hb 10.6 on admit and normocytic.  Now 9.3  -No bleeding  -Ferritin low but T-sat 17.1% - mixed picture with possible component of iron deficiency   -Await b12 and folate  -Repeat cbc in AM

## 2022-05-12 NOTE — ASSESSMENT & PLAN NOTE
Body mass index is 31.31 kg/m². Morbid obesity complicates all aspects of disease management from diagnostic modalities to treatment. Weight loss encouraged and health benefits explained to patient.

## 2022-05-12 NOTE — ASSESSMENT & PLAN NOTE
-On admit Cr 2.23 and now 2.8  -Baseline Cr per daughter is 2.2  -Avoid nephrotoxic agents and renally dose meds  -IESHA without evidence of hydronephrosis.  -FEUrea 48% - suggesting intrinsic renal disease  -Holding home entresto for now.  -Fluid overloaded on admit and diuresed with IV lasix.  Will hold lasix for now.  -Discontinue hernandez.  -Repeat BMP in AM.

## 2022-05-12 NOTE — ASSESSMENT & PLAN NOTE
-A1c 6.9  -Home med list includes dapaglifozin, aspart insulin, detemir insulin and tradjenta.  -Treat with ssi ac/hs for now.

## 2022-05-12 NOTE — SUBJECTIVE & OBJECTIVE
Interval History: No acute events overnight.  Did not tolerate our cpap - stated it was too tight and very uncomfortable.  Feeling better overall.  Discussed with patient's daughters on speaker phone at bedside.  No chest or abdominal pain.  All questions answered and patient had no further complaints.    Review of Systems   Constitutional:  Negative for activity change, chills, diaphoresis and fatigue.   HENT:  Negative for congestion, drooling and hearing loss.    Eyes:  Negative for discharge.   Respiratory:  Negative for apnea, chest tightness, shortness of breath and wheezing.    Cardiovascular:  Negative for palpitations and leg swelling.   Gastrointestinal:  Negative for abdominal distention, abdominal pain, constipation and diarrhea.   Endocrine: Negative.    Genitourinary:  Negative for difficulty urinating.   Musculoskeletal:  Negative for arthralgias and gait problem.   Skin:  Negative for rash.   Neurological:  Positive for weakness. Negative for seizures, light-headedness and numbness.   Hematological:  Negative for adenopathy.   Psychiatric/Behavioral:  Negative for agitation and behavioral problems.    Objective:     Vital Signs (Most Recent):  Temp: 97.8 °F (36.6 °C) (05/12/22 1132)  Pulse: 83 (05/12/22 1132)  Resp: 20 (05/12/22 1132)  BP: (!) 88/52 (05/12/22 1132)  SpO2: 96 % (05/12/22 1132) Vital Signs (24h Range):  Temp:  [97.6 °F (36.4 °C)-98.3 °F (36.8 °C)] 97.8 °F (36.6 °C)  Pulse:  [74-86] 83  Resp:  [16-21] 20  SpO2:  [95 %-99 %] 96 %  BP: ()/(52-72) 88/52     Weight: 88 kg (194 lb 0.1 oz)  Body mass index is 31.31 kg/m².    Intake/Output Summary (Last 24 hours) at 5/12/2022 1205  Last data filed at 5/12/2022 0900  Gross per 24 hour   Intake 460 ml   Output 1700 ml   Net -1240 ml        Physical Exam  Vitals and nursing note reviewed.   Constitutional:       General: She is not in acute distress.     Appearance: She is well-developed. She is not ill-appearing, toxic-appearing or  diaphoretic.   HENT:      Head: Normocephalic and atraumatic.      Right Ear: External ear normal.      Left Ear: External ear normal.      Nose: Nose normal.      Mouth/Throat:      Mouth: Mucous membranes are moist.   Eyes:      Extraocular Movements: Extraocular movements intact.      Conjunctiva/sclera: Conjunctivae normal.   Cardiovascular:      Rate and Rhythm: Normal rate and regular rhythm.      Heart sounds: Normal heart sounds.      Comments: AICD noted in left chest  Pulmonary:      Effort: Pulmonary effort is normal. No respiratory distress.      Breath sounds: Normal breath sounds. No wheezing or rales.   Abdominal:      General: Bowel sounds are normal. There is no distension.      Palpations: Abdomen is soft.      Tenderness: There is no abdominal tenderness.   Musculoskeletal:         General: Normal range of motion.      Cervical back: Normal range of motion. No rigidity.      Right lower leg: No edema.      Left lower leg: No edema.   Skin:     General: Skin is warm and dry.   Neurological:      Mental Status: She is alert and oriented to person, place, and time.      Cranial Nerves: No cranial nerve deficit.      Coordination: Coordination normal.      Comments: Diffuse weakness - non-focal   Psychiatric:         Behavior: Behavior normal.       Significant Labs: All pertinent labs within the past 24 hours have been reviewed.    Significant Imaging: I have reviewed all pertinent imaging results/findings within the past 24 hours.

## 2022-05-12 NOTE — PT/OT/SLP EVAL
Physical Therapy Evaluation and Discharge Note    Patient Name:  Perry Guerin   MRN:  [id number]    Recommendations:     Discharge Recommendations:  home health PT   Discharge Equipment Recommendations: walker, randy   Barriers to discharge: None    Assessment:     Perry Guerin is a 76 y.o. female admitted with a medical diagnosis of Acute on chronic combined systolic and diastolic congestive heart failure. .  At this time, patient is functioning at their prior level of function and does not require further acute PT services.     Recent Surgery: * No surgery found *      Plan:     During this hospitalization, patient does not require further acute PT services.  Please re-consult if situation changes.      Subjective     Chief Complaint: No c/o  Patient/Family Comments/goals: to get out of the room and take a walk  Pain/Comfort:  · Pain Rating 1: 0/10    Patients cultural, spiritual, Zoroastrianism conflicts given the current situation: no    Living Environment:  Pt lives between the houses of 3 adult children.    Prior to admission, patients level of function was Modified independent with SPC for ambulation.  Equipment used at home: cane, straight, rollator.  DME owned (not currently used): none.  Upon discharge, patient will have assistance from [facility].    Objective:     Communicated with nsg prior to session.  Patient found up in chair with hernandez catheter, telemetry, peripheral IV upon PT entry to room.    General Precautions: Standard, fall   Orthopedic Precautions:N/A   Braces: N/A   Respiratory Status: Room air    Exams:  · Cognitive Exam:  Patient is oriented to Person, Place, Time and Situation  · Gross Motor Coordination:  WFL  · Postural Exam:  Patient presented with the following abnormalities:    · -       Rounded shoulders  · -       Forward head  · Sensation:    · -       Intact  light/touch B LE's  · Skin Integrity/Edema:      · -       Skin integrity: Visible skin intact  · RLE ROM: WFL  · RLE  Strength: WFL  · LLE ROM: WFL  · LLE Strength: WFL    Functional Mobility:  · Transfers:     · Sit to Stand:  contact guard assistance with rolling walker  · Gait: Mod I with RW approx 250', Max decreased breonna  · Balance: Fair+    AM-PAC 6 CLICK MOBILITY  Total Score:21       Therapeutic Activities and Exercises:   Handout provided to and reviewed with pt for B AP's, marching, and FAQ's in sittting and AP's, TKE's, and GS in reclined/supine.  Pt verbalized/demonstrated understanding with VC/TC to perform correctly    AM-PAC 6 CLICK MOBILITY  Total Score:21     Patient left up in chair with all lines intact, call button in reach and nsg notified.    GOALS:   Multidisciplinary Problems     Physical Therapy Goals        Problem: Physical Therapy    Goal Priority Disciplines Outcome Goal Variances Interventions   Physical Therapy Goal     PT, PT/OT Adequate for Care Transition                     History:     Past Medical History:   Diagnosis Date    Back pain     Gout, unspecified     HTN (hypertension)     Type 2 diabetes mellitus without complications        No past surgical history on file.    Time Tracking:     PT Received On: 05/12/22  PT Start Time: 1135     PT Stop Time: 1200  PT Total Time (min): 25 min     Billable Minutes: Evaluation 15 and Therapeutic Exercise 10      05/12/2022

## 2022-05-12 NOTE — RESPIRATORY THERAPY
Patient was on 2L face mask with Sp  O2 at 100%. I placed patient on a 2LNC a nd SpOs maintained at 100%. Patient does wear home O2 at 2L. Will continue to monitor sats.

## 2022-05-12 NOTE — PLAN OF CARE
Problem: Occupational Therapy  Goal: Occupational Therapy Goal  Description: Goals to be met by: 5/26/22    Patient will increase functional independence with ADLs by performing:    UE Dressing with Modified [place].  LE Dressing with Modified [place].  Grooming while standing at sink with Modified [place] and Assistive Devices as needed.  Toileting from toilet with Modified [place] and Assistive Devices as needed for hygiene and clothing management.   Supine to sit with Modified [place].  Step transfer with Modified [place]  Toilet transfer to toilet with Modified [place].  Upper extremity exercise program x15 reps per handout, with independence.    Outcome: Ongoing, Progressing

## 2022-05-12 NOTE — ASSESSMENT & PLAN NOTE
-Platelets 136 on admit.  Now 119  -Unclear if new or chronic.  -No evidence of bleeding., but Hb dropped almost a gram  -Await folic acid and b12  -Repeat cbc in AM.

## 2022-05-13 VITALS
RESPIRATION RATE: 19 BRPM | TEMPERATURE: 98 F | SYSTOLIC BLOOD PRESSURE: 114 MMHG | DIASTOLIC BLOOD PRESSURE: 77 MMHG | BODY MASS INDEX: 31.22 KG/M2 | HEART RATE: 92 BPM | OXYGEN SATURATION: 94 % | HEIGHT: 66 IN | WEIGHT: 194.25 LBS

## 2022-05-13 LAB
ANION GAP SERPL CALC-SCNC: 10 MMOL/L (ref 8–16)
BASOPHILS # BLD AUTO: 0.02 K/UL (ref 0–0.2)
BASOPHILS NFR BLD: 0.4 % (ref 0–1.9)
BUN SERPL-MCNC: 55 MG/DL (ref 8–23)
CALCIUM SERPL-MCNC: 8.8 MG/DL (ref 8.7–10.5)
CHLORIDE SERPL-SCNC: 102 MMOL/L (ref 95–110)
CO2 SERPL-SCNC: 28 MMOL/L (ref 23–29)
CREAT SERPL-MCNC: 2.3 MG/DL (ref 0.5–1.4)
DIFFERENTIAL METHOD: ABNORMAL
EOSINOPHIL # BLD AUTO: 0.1 K/UL (ref 0–0.5)
EOSINOPHIL NFR BLD: 1.2 % (ref 0–8)
ERYTHROCYTE [DISTWIDTH] IN BLOOD BY AUTOMATED COUNT: 16.9 % (ref 11.5–14.5)
EST. GFR  (AFRICAN AMERICAN): 23 ML/MIN/1.73 M^2
EST. GFR  (NON AFRICAN AMERICAN): 20 ML/MIN/1.73 M^2
GLUCOSE SERPL-MCNC: 129 MG/DL (ref 70–110)
HCT VFR BLD AUTO: 30.5 % (ref 37–48.5)
HGB BLD-MCNC: 9.2 G/DL (ref 12–16)
IMM GRANULOCYTES # BLD AUTO: 0.02 K/UL (ref 0–0.04)
IMM GRANULOCYTES NFR BLD AUTO: 0.4 % (ref 0–0.5)
LYMPHOCYTES # BLD AUTO: 2.1 K/UL (ref 1–4.8)
LYMPHOCYTES NFR BLD: 40.3 % (ref 18–48)
MCH RBC QN AUTO: 26.7 PG (ref 27–31)
MCHC RBC AUTO-ENTMCNC: 30.2 G/DL (ref 32–36)
MCV RBC AUTO: 88 FL (ref 82–98)
MONOCYTES # BLD AUTO: 0.8 K/UL (ref 0.3–1)
MONOCYTES NFR BLD: 14.5 % (ref 4–15)
NEUTROPHILS # BLD AUTO: 2.2 K/UL (ref 1.8–7.7)
NEUTROPHILS NFR BLD: 43.2 % (ref 38–73)
NRBC BLD-RTO: 0 /100 WBC
OB PNL STL: NEGATIVE
PLATELET # BLD AUTO: 118 K/UL (ref 150–450)
PMV BLD AUTO: ABNORMAL FL (ref 9.2–12.9)
POCT GLUCOSE: 114 MG/DL (ref 70–110)
POCT GLUCOSE: 170 MG/DL (ref 70–110)
POTASSIUM SERPL-SCNC: 3.9 MMOL/L (ref 3.5–5.1)
RBC # BLD AUTO: 3.45 M/UL (ref 4–5.4)
SODIUM SERPL-SCNC: 140 MMOL/L (ref 136–145)
WBC # BLD AUTO: 5.18 K/UL (ref 3.9–12.7)

## 2022-05-13 PROCEDURE — C9113 INJ PANTOPRAZOLE SODIUM, VIA: HCPCS | Performed by: EMERGENCY MEDICINE

## 2022-05-13 PROCEDURE — 63600175 PHARM REV CODE 636 W HCPCS: Performed by: EMERGENCY MEDICINE

## 2022-05-13 PROCEDURE — 25000003 PHARM REV CODE 250: Performed by: NURSE PRACTITIONER

## 2022-05-13 PROCEDURE — 80048 BASIC METABOLIC PNL TOTAL CA: CPT | Performed by: EMERGENCY MEDICINE

## 2022-05-13 PROCEDURE — 82272 OCCULT BLD FECES 1-3 TESTS: CPT | Performed by: EMERGENCY MEDICINE

## 2022-05-13 PROCEDURE — 25000003 PHARM REV CODE 250: Performed by: EMERGENCY MEDICINE

## 2022-05-13 PROCEDURE — 97535 SELF CARE MNGMENT TRAINING: CPT

## 2022-05-13 PROCEDURE — 25000003 PHARM REV CODE 250: Performed by: HOSPITALIST

## 2022-05-13 PROCEDURE — 36415 COLL VENOUS BLD VENIPUNCTURE: CPT | Performed by: EMERGENCY MEDICINE

## 2022-05-13 PROCEDURE — 85025 COMPLETE CBC W/AUTO DIFF WBC: CPT | Performed by: EMERGENCY MEDICINE

## 2022-05-13 PROCEDURE — 97110 THERAPEUTIC EXERCISES: CPT

## 2022-05-13 RX ORDER — FOLIC ACID 1 MG/1
1 TABLET ORAL DAILY
Qty: 30 TABLET | Refills: 1 | Status: SHIPPED | OUTPATIENT
Start: 2022-05-13 | End: 2023-05-13

## 2022-05-13 RX ORDER — POLYETHYLENE GLYCOL 3350 17 G/17G
17 POWDER, FOR SOLUTION ORAL DAILY
Status: DISCONTINUED | OUTPATIENT
Start: 2022-05-13 | End: 2022-05-13

## 2022-05-13 RX ORDER — CALCIUM CARBONATE 1250 MG/5ML
1000 SUSPENSION ORAL 2 TIMES DAILY WITH MEALS
Status: DISCONTINUED | OUTPATIENT
Start: 2022-05-13 | End: 2022-05-13

## 2022-05-13 RX ORDER — CALCIUM CARBONATE 200(500)MG
500 TABLET,CHEWABLE ORAL ONCE
Status: COMPLETED | OUTPATIENT
Start: 2022-05-13 | End: 2022-05-13

## 2022-05-13 RX ORDER — FERROUS SULFATE 325(65) MG
325 TABLET ORAL DAILY
Qty: 30 TABLET | Refills: 1 | Status: SHIPPED | OUTPATIENT
Start: 2022-05-13

## 2022-05-13 RX ORDER — CALCIUM CARBONATE 1250 MG/5ML
500 SUSPENSION ORAL ONCE
Status: DISCONTINUED | OUTPATIENT
Start: 2022-05-13 | End: 2022-05-13

## 2022-05-13 RX ORDER — POLYETHYLENE GLYCOL 3350 17 G/17G
17 POWDER, FOR SOLUTION ORAL ONCE
Status: COMPLETED | OUTPATIENT
Start: 2022-05-13 | End: 2022-05-13

## 2022-05-13 RX ADMIN — MUPIROCIN: 20 OINTMENT TOPICAL at 10:05

## 2022-05-13 RX ADMIN — TRAMADOL HYDROCHLORIDE 50 MG: 50 TABLET, COATED ORAL at 11:05

## 2022-05-13 RX ADMIN — CALCIUM CARBONATE (ANTACID) CHEW TAB 500 MG 500 MG: 500 CHEW TAB at 10:05

## 2022-05-13 RX ADMIN — ASPIRIN 325 MG: 325 TABLET, COATED ORAL at 09:05

## 2022-05-13 RX ADMIN — ENOXAPARIN SODIUM 30 MG: 30 INJECTION SUBCUTANEOUS at 05:05

## 2022-05-13 RX ADMIN — FERROUS SULFATE TAB 325 MG (65 MG ELEMENTAL FE) 1 EACH: 325 (65 FE) TAB at 09:05

## 2022-05-13 RX ADMIN — POLYETHYLENE GLYCOL 3350 17 G: 17 POWDER, FOR SOLUTION ORAL at 07:05

## 2022-05-13 RX ADMIN — PANTOPRAZOLE SODIUM 40 MG: 40 INJECTION, POWDER, FOR SOLUTION INTRAVENOUS at 10:05

## 2022-05-13 NOTE — PT/OT/SLP PROGRESS
Occupational Therapy   Treatment/Discharge    Name: Perry Guerin  MRN: [id number]  Admitting Diagnosis:  Acute on chronic combined systolic and diastolic congestive heart failure       Recommendations:     Discharge Recommendations: home health OT (with famil;y assist)  Discharge Equipment Recommendations:  walker, rolling, bedside commode  Barriers to discharge:  None    Assessment:     Perry Guerin is a 76 y.o. female with a medical diagnosis of Acute on chronic combined systolic and diastolic congestive heart failure.   Performance deficits affecting function are impaired endurance.    The patient is Mod I with self care and amb usings a RW to the bathroom. The patient  was able to perform UE therex/HEP and was given a written HEP. The patient is ok to D/C from OT standpoint.            Rehab Prognosis:  Good; patient would benefit from acute skilled OT services to address these deficits and reach maximum level of function.       Plan:     Patient to be seen 3 x/week to address the above listed problems via self-care/home management, therapeutic activities, therapeutic exercises  · Plan of Care Expires: 05/26/22  · Plan of Care Reviewed with: patient    Subjective     Pain/Comfort:  · Pain Rating Post-Intervention 1:  (back pain with activity)  · Pain Addressed 2: Pre-medicate for activity, Cessation of Activity, Reposition    Objective:     Communicated with: Bradly gonzalez prior to session.  Patient found HOB elevated with telemetry upon OT entry to room.    General Precautions: Standard, fall   Orthopedic Precautions:N/A   Braces: N/A  Respiratory Status: Room air     Occupational Performance:     Bed Mobility:    · Patient completed Scooting/Bridging with modified independence  · Patient completed Supine to Sit with modified independence and with HOB elevated     Functional Mobility/Transfers:  · Patient completed Sit <> Stand Transfer with modified independence  with  rolling walker   · Patient  completed Toilet Transfer Step Transfer technique with modified independence with  rolling walker and grab bars  · Functional Mobility: The patient amb using a RW to the toilet and sink mod I    Activities of Daily Living:  · Grooming: modified independence and supervision to stand at the sink to brush her teeth and wash her hands  · Upper Body Dressing: contact guard assistance to don back gown  · Toileting: modified independence        [facility] 6 Click ADL: 24    Treatment & Education:  · Participated in self care and functional mobility as noted above  Pt completed  x 10 BUE AROM in seated position:  Digits I-V flexion/extension   Wrist flexion/extension  Forearm pronation/supination  Elbow flexion/extension  Shoulder flexion/extension  Shoulder elevation/depression   Forward punches  Shoulder ab/dduction   Handout placed on table  Pt encouraged to complete daily x10 reps a day    Patient left up in chair with all lines intact and call button in reachEducation:      GOALS:   Multidisciplinary Problems     Occupational Therapy Goals     Not on file          Multidisciplinary Problems (Resolved)        Problem: Occupational Therapy    Goal Priority Disciplines Outcome Interventions   Occupational Therapy Goal   (Resolved)     OT, PT/OT Met    Description: Goals to be met by: 5/26/22    Patient will increase functional independence with ADLs by performing:    UE Dressing with Modified [place].  LE Dressing with Modified [place].  Grooming while standing at sink with Modified [place] and Assistive Devices as needed.  Toileting from toilet with Modified [place] and Assistive Devices as needed for hygiene and clothing management.   Supine to sit with Modified [place].  Step transfer with Modified [place]  Toilet transfer to toilet with Modified [place].  Upper extremity exercise program x15 reps per handout, with independence.                     Time Tracking:     OT Date of Treatment:  05/13/22  OT Start Time: 1538  OT Stop Time: 1605  OT Total Time (min): 27 min    Billable Minutes:Self Care/Home Management 15  Therapeutic Exercise 12  Total Time 27    OT/JENNIE: OT          5/13/2022

## 2022-05-13 NOTE — DISCHARGE INSTRUCTIONS
Take all medications as prescribed.  Please note that for now we are stopping detemir, aspart, farxiga and entresto.  When you follow up with your primary care provider you may need to resume some or all of these.  Eat a strict low salt cardiac and renal diet.  Follow up with your physicians as scheduled - pcp within 1 week, nephrologist within 2 weeks.  Thank you for trusting [facility] and Dr. Aviles with your care.  We are honored that you entrusted us with your healthcare needs. Your satisfaction is very important to us and we hope you have been very pleased with your experience at [facility]. After your discharge you may receive a survey asking you to rate your hospital experience. We encourage you to take the time to complete the survey as your feedback allows us to identify areas for improvement as well as recognize our staff.   We hope that you have received the very best care possible during your hospitalization at [facility], as your satisfaction is our top priority.  5)  Please keep previously scheduled appointment with nephrology

## 2022-05-13 NOTE — PLAN OF CARE
Problem: Occupational Therapy  Goal: Occupational Therapy Goal  Description: Goals to be met by: 5/26/22    Patient will increase functional independence with ADLs by performing:    UE Dressing with Modified [place].  LE Dressing with Modified [place].  Grooming while standing at sink with Modified [place] and Assistive Devices as needed.  Toileting from toilet with Modified [place] and Assistive Devices as needed for hygiene and clothing management.   Supine to sit with Modified [place].  Step transfer with Modified [place]  Toilet transfer to toilet with Modified [place].  Upper extremity exercise program x15 reps per handout, with independence.    Outcome: Met   The patient was able to perform UE therex/HEP. The patient is ok to D/C from OT standpoint.

## 2022-05-13 NOTE — PLAN OF CARE
05/13/22 1532   Final Note   Assessment Type Final Discharge Note   Anticipated Discharge Disposition Home-Health   Hospital Resources/Appts/Education Provided Appointments scheduled and added to AVS   Post-Acute Status   Post-Acute Authorization Home Health   Home Health Status Set-up Complete/Auth obtained   Patient choice form signed by patient/caregiver   (Patient and family wanted service she used before)   Discharge Delays None known at this time

## 2022-05-13 NOTE — CONSULTS
Appointments previously scheduled for 5/23/22.  HH in progress.  Patient choice form completed by patient.  She requested first available HH.  CM spoke with daughter who stated patient usually uses [facility] .   DC in progress.

## 2022-05-13 NOTE — PROGRESS NOTES
SSC delivered RW and BSC directly to patient room. Patient signed for DME. Nurse and CM notified of delivery.

## 2022-05-13 NOTE — PLAN OF CARE
[facility] confirmed with pt's son Martin that PCP we have on file is correct. Attempted to reach PCP office x's 3 to schedule hospital follow up but phone rings to busy signal. SSC left instructions in follow up tab for pt to call to schedule appointment.

## 2022-05-13 NOTE — ASSESSMENT & PLAN NOTE
Body mass index is 31.35 kg/m². Morbid obesity complicates all aspects of disease management from diagnostic modalities to treatment. Weight loss encouraged and health benefits explained to patient.

## 2022-05-13 NOTE — PLAN OF CARE
[facility] [place]      HOME HEALTH ORDERS  FACE TO FACE ENCOUNTER    Patient Name: Perry Guerin  YOB: 1945    PCP: Angel Lopez MD   PCP Address: [address] [place] [address] [id number]  PCP Phone Number: [phone]  PCP Fax: [phone]    Encounter Date: 5/10/22    Admit to Home Health    Diagnoses:  Active Hospital Problems    Diagnosis  POA    *Acute on chronic combined systolic and diastolic congestive heart failure [I50.43]  Yes     Priority: 1 - High    Acute renal failure [N17.9]  Yes     Priority: 2     Troponin level elevated [R77.8]  Yes     Priority: 3     Thrombocytopenia [D69.6]  Yes     Priority: 4     Type 2 diabetes mellitus with kidney complication, with long-term current use of insulin [E11.29, Z79.4]  Not Applicable    Essential hypertension [I10]  Yes    Class 1 obesity in adult [E66.9]  Yes    Respiratory failure with hypoxia [J96.91]  Yes    Debility [R53.81]  Yes    Anemia [D64.9]  Yes      Resolved Hospital Problems   No resolved problems to display.       Follow Up Appointments:  No future appointments.    Allergies:Review of patient's allergies indicates:  No Known Allergies    Medications: Review discharge medications with patient and family and provide education.    Current Discharge Medication List      START taking these medications    Details   ferrous sulfate 325 (65 FE) MG EC tablet Take 1 tablet (325 mg total) by mouth once daily.  Qty: 30 tablet, Refills: 1      folic acid (FOLVITE) 1 MG tablet Take 1 tablet (1 mg total) by mouth once daily.  Qty: 30 tablet, Refills: 1         CONTINUE these medications which have NOT CHANGED    Details   aspirin (ECOTRIN) 81 MG EC tablet Take 81 mg by mouth once daily.      carvediloL (COREG) 3.125 MG tablet Take 3.125 mg by mouth 2 (two) times daily with meals.      linaGLIPtin (TRADJENTA) 5 mg Tab tablet Take 5 mg by mouth every morning.      traMADoL (ULTRAM) 50 mg tablet Take 50 mg by mouth  every 6 (six) hours as needed for Pain.      traZODone (DESYREL) 50 MG tablet Take 50 mg by mouth every evening.      allopurinoL (ZYLOPRIM) 300 MG tablet Take 300 mg by mouth once daily.      atorvastatin (LIPITOR) 20 MG tablet Take 20 mg by mouth every evening.      furosemide (LASIX) 40 MG tablet Take 60 mg by mouth once daily.      loperamide (IMODIUM) 2 mg capsule Take 2 mg by mouth 4 (four) times daily as needed for Diarrhea.      magnesium oxide (MAG-OX) 400 mg (241.3 mg magnesium) tablet Take 400 mg by mouth once daily.      pantoprazole (PROTONIX) 40 MG tablet Take 40 mg by mouth every evening.      vitamin E 400 UNIT capsule Take 400 Units by mouth once daily.         STOP taking these medications       dapagliflozin (FARXIGA) 10 mg tablet Comments:   Reason for Stopping:         insulin aspart (NOVOLOG FLEXPEN U-100 INSULIN SUBQ) Comments:   Reason for Stopping:         insulin detemir U-100 (LEVEMIR) 100 unit/mL injection Comments:   Reason for Stopping:         sacubitriL-valsartan (ENTRESTO) 24-26 mg per tablet Comments:   Reason for Stopping:                 I have seen and examined this patient within the last 30 days. My clinical findings that support the need for the home health skilled services and home bound status are the following:no   Weakness/numbness causing balance and gait disturbance due to Heart Failure and Weakness/Debility making it taxing to leave home.  Medical restrictions requiring assistance of another human to leave home due to  Dyspnea on exertion (SOB) and Home oxygen requirement.     Diet:   cardiac diet, diabetic diet 1800 calorie, renal diet and 2 gram sodium diet    Labs:  SN to perform labs:  BMP: Weekly; 2 week(s) and fax to primary care providers office    Referrals/ Consults  Physical Therapy to evaluate and treat. Evaluate for home safety and equipment needs; Establish/upgrade home exercise program. Perform / instruct on therapeutic exercises, gait training, transfer  training, and Range of Motion.  Occupational Therapy to evaluate and treat. Evaluate home environment for safety and equipment needs. Perform/Instruct on transfers, ADL training, ROM, and therapeutic exercises.  Aide to provide assistance with personal care, ADLs, and vital signs.    Activities:   ambulate in house with assistance    Nursing:   Agency to admit patient within 24 hours of hospital discharge unless specified on physician order or at patient request    SN to complete comprehensive assessment including routine vital signs. Instruct on disease process and s/s of complications to report to MD. Review/verify medication list sent home with the patient at time of discharge  and instruct patient/caregiver as needed. Frequency may be adjusted depending on start of care date.     Skilled nurse to perform up to 3 visits PRN for symptoms related to diagnosis    Notify MD if SBP > 160 or < 90; DBP > 90 or < 50; HR > 120 or < 50; Temp > 101; O2 < 88%; Other:       Ok to schedule additional visits based on staff availability and patient request on consecutive days within the home health episode.    When multiple disciplines ordered:    Start of Care occurs on Sunday - Wednesday schedule remaining discipline evaluations as ordered on separate consecutive days following the start of care.    Thursday SOC -schedule subsequent evaluations Friday and Monday the following week.     Friday - Saturday SOC - schedule subsequent discipline evaluations on consecutive days starting Monday of the following week.    For all post-discharge communication and subsequent orders please contact patient's primary care physician.     Miscellaneous   Diabetic Care:   SN to perform and educate Diabetic management with blood glucose monitoring:, Fingerstick blood sugar a.m. and p.m. and Report CBG < 60 or > 350 to physician.  Heart Failure:      SN to instruct on the following:    Instruct on the definition of CHF.   Instruct on the  signs/sympoms of CHF to be reported.   Instruct on and monitor daily weights.   Instruct on factors that cause exacerbation.   Instruct on action, dose, schedule, and side effects of medications.   Instruct on diet as prescribed.   Instruct on activity allowed.   Instruct on life-style modifications for life long management of CHF   SN to assess compliance with daily weights, diet, medications, fluid retention,    safety precautions, activities permitted and life-style modifications.   Additional 1-2 SN visits per week as needed for signs and symptoms     of CHF exacerbation.      For Weight Gain > 2-3 lbs in 1 day or 4-6 lbs over 1 week notify PCP:  CHF DIURETIC SLIDING SCALE     Skilled nurse visits daily to instruct and monitor medication adherence until target weight. Then resume prior order frequency.     If weight gain exceeds 5 lbs over target weight, call MD     Home Oxygen:  Oxygen at 2 L/min nasal canula to be used:  At bedtime.    Home Health Aide:  Nursing Twice weekly, Physical Therapy Three times weekly, Occupational Therapy Three times weekly, Respiratory Therapy Three times weekly and Home Health Aide Twice weekly      I certify that this patient is confined to her home and needs intermittent skilled nursing care, physical therapy and occupational therapy.

## 2022-05-13 NOTE — DISCHARGE SUMMARY
[facility]  Discharge Summary      Patient Name: Perry Guerin  MRN: [id number]  Patient Class: IP- Inpatient  Admission Date: 5/10/2022  Hospital Length of Stay: 3 days  Discharge Date and Time: No discharge date for patient encounter.  Attending Physician: Anjelica Aviles MD   Discharging Provider: Anjelica Aviles MD  Primary Care Provider: Angel Lopez MD      HPI:   76 y.o. female PMHx CHF, COPD presents with SOB and chest pressure that worsened immediately PTA. States that symptoms feel tight. Symptoms associated with abdominal pain. Patient is visiting from out of town when she started to have symptoms st 6pm     ED course:  WBC normal. Hg 10 and Cr 2.3 up from 0.8 five years ago. Patient has elevated bnp (780) and tn (0.032). CXR shows evidence of pulm edema.  Admitted to Hospital Medicine for further evaluation    Goals of Care Treatment Preferences:  Code Status: Full Code      Consults:   Consults (From admission, onward)        Status Ordering Provider     Inpatient consult to Social Work  Once        Provider:  (Not yet assigned)    Completed ANJELICA AVILES     Inpatient consult to Social Work/Case Management  Once        Provider:  (Not yet assigned)    Completed MARCOS BARR     Inpatient consult to Registered Dietitian/Nutritionist  Once        Provider:  (Not yet assigned)    Completed MARCOS BARR        Hospital Course By Problem:   * Acute on chronic combined systolic and diastolic congestive heart failure  -Admitted to inpatient status  -On admit noted elevated BNP and pulmonary edema on CXR with some shortness of breath  -BNP elevated at 780  -AICD in place - presumed to be systolic chf.  -Strict ins/outs and daily weights  -Echo showed Ef 40%, grade I diastolic dysfunction, sever LAE  -Diuresed with IV lasix and 4.4L net negative since admit  -held entresto and IV lasix on 5/12 due to bump in Cr.  -Cr improved and she is stable for discharge  home.  -Continue home lasix and coreg.  Holding entresto due to soft blood pressure until she follows up in clinic with nephrology, pcp or cardiology.  -Stable for discharge home with HH.    Acute on chronic renal failure stage IV.  -On admit Cr 2.23 and up to 2.8 on 5/12  -Baseline Cr per daughter is 2.2  -Avoid nephrotoxic agents and renally dose meds  -IESHA without evidence of hydronephrosis.  -FEUrea 48% - suggesting intrinsic renal disease  -Holding home entresto for now.  -Fluid overloaded on admit and diuresed with IV lasix, which was held on 5/12.  -Cr improved to 2.3 at discharge.  -Continue home lasix but not entresto until f/u with pcp or nephrologist.    Troponin level elevated  -Troponin 0.03--0.10 --0.08  -Had chest vs abdominal pain prior to admit.  Patient reports it was much more abdominal pain to me.  -EKG showed no ST change and TWI in V5/V6 which were also present in 2007.  -Echo reviewed as above - no segmental wall motion abnormalities  -Suspect this was demand ischemia in setting of chf exacerbation and acute/chronic renal dysfunction     Thrombocytopenia  -Platelets 136 on admit.  Now 118  -Unclear if new or chronic.  -No evidence of bleeding., but Hb dropped a bit  -B12 is replete - folic acid almost low.  -Start folic acid at discharge    Debility  -Consulted PT/OT - ordered HH at discharge    Chronic Respiratory failure with hypoxia  -Patient with Hypoxic Respiratory failure which is Chronic.  She is on home oxygen at 2 LPM at night only.  -Signs/symptoms of respiratory failure include- tachypnea.   -Contributing diagnoses includes - CHF and COPD   -Labs and images were reviewed. Patient Has not had a recent ABG.   -Will treat underlying causes and adjust management of respiratory failure as follows-   -Treated CHF as above.  -Continue supplemental O2 2L QHS.    Class 1 obesity in adult  Body mass index is 31.35 kg/m². Morbid obesity complicates all aspects of disease management from  diagnostic modalities to treatment. Weight loss encouraged and health benefits explained to patient.    Essential hypertension  -BP low normal   -Continue home coreg   -Holding entresto due to renal dysfunction and soft blood pressure.    Type 2 diabetes mellitus with kidney complication, with long-term current use of insulin  -A1c 6.9  -Home med list includes dapaglifozin, aspart insulin, detemir insulin and tradjenta.  -Treated with ssi ac/hs for now with minimal need for insulin during her stay.  -At discharge continue linagliptin, but not dapagliflozin, aspart insulin or detemir insulin.  -Follow up with pcp within 1 week for close monitoring.    Anemia  -Hb 10.6 on admit and normocytic.  Now 9.2  -No bleeding  -Ferritin low but T-sat 17.1% - mixed picture with possible component of iron deficiency   -B12 replete but folate lowish  -Started FeSO4 and folic acid.    Final Active Diagnoses:    Diagnosis Date Noted POA    PRINCIPAL PROBLEM:  Acute on chronic combined systolic and diastolic congestive heart failure [I50.43] 05/10/2022 Yes    Acute renal failure [N17.9] 05/10/2022 Yes    Troponin level elevated [R77.8] 05/11/2022 Yes    Thrombocytopenia [D69.6] 05/10/2022 Yes    Type 2 diabetes mellitus with kidney complication, with long-term current use of insulin [E11.29, Z79.4] 05/11/2022 Not Applicable    Essential hypertension [I10] 05/11/2022 Yes    Class 1 obesity in adult [E66.9] 05/11/2022 Yes    Respiratory failure with hypoxia [J96.91] 05/11/2022 Yes    Debility [R53.81] 05/11/2022 Yes    Anemia [D64.9] 05/10/2022 Yes      Problems Resolved During this Admission:       Discharged Condition: stable    Disposition: Home-Health Care [facility]    Follow Up:   Follow-up Information     Angel Lopez MD Follow up.    Specialty: Family Medicine  Why: Keep appointment as scheduled for Hospital Follow up  Contact information:  [facility] [facility] [address]  [place] [address]  [phone]              "[facility] [place] Follow up.    Specialties: Home Health Services, Home Therapy Services, Home Living Aide Services  Contact information:  [address] [facility] [address]  [phone]           [facility] Follow up.    Specialty: DME Provider  Why: DME  Contact information:  [facility] [address]  [phone]                       Patient Instructions:      COMMODE FOR HOME USE     Order Specific Question Answer Comments   Type: Standard    Height: 5' 6" (1.676 m)    Weight: 88.1 kg (194 lb 4.2 oz)    Does patient have medical equipment at home? cane, straight    Does patient have medical equipment at home? rollator    Length of need (1-99 months): 99      WALKER FOR HOME USE     Order Specific Question Answer Comments   Type of Walker: Adult (5'4"-6'6")    With wheels? Yes    Height: 5' 6" (1.676 m)    Weight: 88.1 kg (194 lb 4.2 oz)    Length of need (1-99 months): 99    Does patient have medical equipment at home? cane, straight    Does patient have medical equipment at home? rollator    Please check all that apply: Patient's condition impairs ambulation.    Please check all that apply: Patient is unable to safely ambulate without equipment.    Please check all that apply: Patient needs help to get in and out of chair.      Diet renal     Diet Cardiac     Diet diabetic     Notify your health care provider if you experience any of the following:  increased confusion or weakness     Notify your health care provider if you experience any of the following:  persistent dizziness, light-headedness, or visual disturbances     Notify your health care provider if you experience any of the following:  worsening rash     Notify your health care provider if you experience any of the following:  severe persistent headache     Notify your health care provider if you experience any of the following:  difficulty breathing or increased cough     Notify your " health care provider if you experience any of the following:  severe uncontrolled pain     Notify your health care provider if you experience any of the following:  persistent nausea and vomiting or diarrhea     Notify your health care provider if you experience any of the following:  temperature >100.4     Activity as tolerated       Significant Diagnostic Studies: Labs:   BMP:   Recent Labs   Lab 05/12/22 0417 05/13/22  0420   * 129*    140   K 3.9 3.9    102   CO2 26 28   BUN 51* 55*   CREATININE 2.8* 2.3*   CALCIUM 8.8 8.8   , CMP   Recent Labs   Lab 05/12/22 0417 05/13/22  0420    140   K 3.9 3.9    102   CO2 26 28   * 129*   BUN 51* 55*   CREATININE 2.8* 2.3*   CALCIUM 8.8 8.8   ANIONGAP 10 10   ESTGFRAFRICA 18* 23*   EGFRNONAA 16* 20*   , CBC   Recent Labs   Lab 05/12/22 0417 05/13/22  0420   WBC 5.36 5.18   HGB 9.3* 9.2*   HCT 30.6* 30.5*   * 118*   , INR   Lab Results   Component Value Date    INR 1.0 05/10/2022    INR 1.1 11/26/2007    INR 1.0 10/24/2007   , Lipid Panel   Lab Results   Component Value Date    CHOL 118 (L) 10/25/2007    HDL 54 10/25/2007    LDLCALC 44.2 (L) 10/25/2007    TRIG 99 10/25/2007    CHOLHDL 45.8 10/25/2007   , Troponin   Recent Labs   Lab 05/10/22  2138 05/11/22  0635 05/11/22  1043   TROPONINI 0.032* 0.101* 0.085*    and A1C:   Recent Labs   Lab 05/10/22  2349   HGBA1C 6.9*       Pending Diagnostic Studies:     None         Medications:  Reconciled Home Medications:      Medication List      START taking these medications    FeroSuL 325 mg (65 mg iron) Tab tablet  Generic drug: ferrous sulfate  Take 1 tablet (325 mg total) by mouth once daily.     folic acid 1 MG tablet  Commonly known as: FOLVITE  Take 1 tablet (1 mg total) by mouth once daily.        CONTINUE taking these medications    allopurinoL 300 MG tablet  Commonly known as: ZYLOPRIM  Take 300 mg by mouth once daily.     aspirin 81 MG EC tablet  Commonly known as:  ECOTRIN  Take 81 mg by mouth once daily.     atorvastatin 20 MG tablet  Commonly known as: LIPITOR  Take 20 mg by mouth every evening.     carvediloL 3.125 MG tablet  Commonly known as: COREG  Take 3.125 mg by mouth 2 (two) times daily with meals.     furosemide 40 MG tablet  Commonly known as: LASIX  Take 60 mg by mouth once daily.     loperamide 2 mg capsule  Commonly known as: IMODIUM  Take 2 mg by mouth 4 (four) times daily as needed for Diarrhea.     magnesium oxide 400 mg (241.3 mg magnesium) tablet  Commonly known as: MAG-OX  Take 400 mg by mouth once daily.     pantoprazole 40 MG tablet  Commonly known as: PROTONIX  Take 40 mg by mouth every evening.     TRADJENTA 5 mg Tab tablet  Generic drug: linaGLIPtin  Take 5 mg by mouth every morning.     traMADoL 50 mg tablet  Commonly known as: ULTRAM  Take 50 mg by mouth every 6 (six) hours as needed for Pain.     traZODone 50 MG tablet  Commonly known as: DESYREL  Take 50 mg by mouth every evening.     vitamin E 400 UNIT capsule  Take 400 Units by mouth once daily.        STOP taking these medications    ENTRESTO 24-26 mg per tablet  Generic drug: sacubitriL-valsartan     FARXIGA 10 mg tablet  Generic drug: dapagliflozin     insulin detemir U-100 100 unit/mL injection  Commonly known as: Levemir     NOVOLOG FLEXPEN U-100 INSULIN SUBQ            Indwelling Lines/Drains at time of discharge:   Lines/Drains/Airways     None                 Time spent on the discharge of patient: 35 minutes         Yandel Aviles MD  Department of Hospital Medicine  [facility]

## 2022-05-13 NOTE — PLAN OF CARE
05/13/22 1446   Post-Acute Status   Post-Acute Authorization Home Health   Home Health Status Referrals Sent   Hospital Resources/Appts/Education Provided Appointments scheduled and added to AVS   Discharge Delays (!) Post-Acute Set-up   Discharge Plan   Discharge Plan A Home Health

## 2022-05-19 ENCOUNTER — HOSPITAL ENCOUNTER (EMERGENCY)
Facility: HOSPITAL | Age: 77
Discharge: HOME | End: 2022-05-19
Attending: EMERGENCY MEDICINE
Payer: MEDICARE

## 2022-05-19 VITALS
BODY MASS INDEX: 30.22 KG/M2 | HEIGHT: 66 IN | HEART RATE: 77 BPM | OXYGEN SATURATION: 100 % | DIASTOLIC BLOOD PRESSURE: 73 MMHG | SYSTOLIC BLOOD PRESSURE: 109 MMHG | RESPIRATION RATE: 21 BRPM | WEIGHT: 188 LBS | TEMPERATURE: 98 F

## 2022-05-19 DIAGNOSIS — R06.02 SHORTNESS OF BREATH: ICD-10-CM

## 2022-05-19 DIAGNOSIS — R74.8 ELEVATED LIPASE: Primary | ICD-10-CM

## 2022-05-19 LAB
ALBUMIN SERPL BCP-MCNC: 4.3 G/DL (ref 3.5–5.2)
ALP SERPL-CCNC: 79 U/L (ref 55–135)
ALT SERPL W/O P-5'-P-CCNC: 10 U/L (ref 10–44)
ANION GAP SERPL CALC-SCNC: 11 MMOL/L (ref 8–16)
AST SERPL-CCNC: 13 U/L (ref 10–40)
BACTERIA #/AREA URNS HPF: NORMAL /HPF
BASOPHILS # BLD AUTO: 0.01 K/UL (ref 0–0.2)
BASOPHILS NFR BLD: 0.2 % (ref 0–1.9)
BILIRUB SERPL-MCNC: 0.6 MG/DL (ref 0.1–1)
BILIRUB UR QL STRIP: NEGATIVE
BNP SERPL-MCNC: 516 PG/ML (ref 0–99)
BUN SERPL-MCNC: 56 MG/DL (ref 8–23)
CALCIUM SERPL-MCNC: 9.8 MG/DL (ref 8.7–10.5)
CHLORIDE SERPL-SCNC: 103 MMOL/L (ref 95–110)
CLARITY UR: CLEAR
CO2 SERPL-SCNC: 26 MMOL/L (ref 23–29)
COLOR UR: COLORLESS
CREAT SERPL-MCNC: 2.7 MG/DL (ref 0.5–1.4)
DIFFERENTIAL METHOD: ABNORMAL
EOSINOPHIL # BLD AUTO: 0 K/UL (ref 0–0.5)
EOSINOPHIL NFR BLD: 0.8 % (ref 0–8)
ERYTHROCYTE [DISTWIDTH] IN BLOOD BY AUTOMATED COUNT: 17.7 % (ref 11.5–14.5)
EST. GFR  (AFRICAN AMERICAN): 19 ML/MIN/1.73 M^2
EST. GFR  (NON AFRICAN AMERICAN): 17 ML/MIN/1.73 M^2
GLUCOSE SERPL-MCNC: 132 MG/DL (ref 70–110)
GLUCOSE UR QL STRIP: ABNORMAL
HCT VFR BLD AUTO: 33.1 % (ref 37–48.5)
HGB BLD-MCNC: 10.1 G/DL (ref 12–16)
HGB UR QL STRIP: NEGATIVE
IMM GRANULOCYTES # BLD AUTO: 0.02 K/UL (ref 0–0.04)
IMM GRANULOCYTES NFR BLD AUTO: 0.4 % (ref 0–0.5)
KETONES UR QL STRIP: NEGATIVE
LEUKOCYTE ESTERASE UR QL STRIP: NEGATIVE
LIPASE SERPL-CCNC: 69 U/L (ref 4–60)
LYMPHOCYTES # BLD AUTO: 1.5 K/UL (ref 1–4.8)
LYMPHOCYTES NFR BLD: 30.4 % (ref 18–48)
MCH RBC QN AUTO: 27.2 PG (ref 27–31)
MCHC RBC AUTO-ENTMCNC: 30.5 G/DL (ref 32–36)
MCV RBC AUTO: 89 FL (ref 82–98)
MICROSCOPIC COMMENT: NORMAL
MONOCYTES # BLD AUTO: 0.3 K/UL (ref 0.3–1)
MONOCYTES NFR BLD: 6.9 % (ref 4–15)
NEUTROPHILS # BLD AUTO: 2.9 K/UL (ref 1.8–7.7)
NEUTROPHILS NFR BLD: 61.3 % (ref 38–73)
NITRITE UR QL STRIP: NEGATIVE
NRBC BLD-RTO: 0 /100 WBC
PH UR STRIP: 7 [PH] (ref 5–8)
PLATELET # BLD AUTO: ABNORMAL K/UL (ref 150–450)
PLATELET BLD QL SMEAR: ABNORMAL
PMV BLD AUTO: ABNORMAL FL (ref 9.2–12.9)
POTASSIUM SERPL-SCNC: 4.7 MMOL/L (ref 3.5–5.1)
PROT SERPL-MCNC: 8.1 G/DL (ref 6–8.4)
PROT UR QL STRIP: NEGATIVE
RBC # BLD AUTO: 3.72 M/UL (ref 4–5.4)
SODIUM SERPL-SCNC: 140 MMOL/L (ref 136–145)
SP GR UR STRIP: 1.01 (ref 1–1.03)
SQUAMOUS #/AREA URNS HPF: 0 /HPF
TROPONIN I SERPL DL<=0.01 NG/ML-MCNC: 0.02 NG/ML (ref 0–0.03)
URN SPEC COLLECT METH UR: ABNORMAL
UROBILINOGEN UR STRIP-ACNC: NEGATIVE EU/DL
WBC # BLD AUTO: 4.8 K/UL (ref 3.9–12.7)
WBC #/AREA URNS HPF: 2 /HPF (ref 0–5)
YEAST URNS QL MICRO: NORMAL

## 2022-05-19 PROCEDURE — 99285 EMERGENCY DEPT VISIT HI MDM: CPT | Mod: 25

## 2022-05-19 PROCEDURE — 85025 COMPLETE CBC W/AUTO DIFF WBC: CPT | Performed by: EMERGENCY MEDICINE

## 2022-05-19 PROCEDURE — 93010 EKG 12-LEAD: ICD-10-PCS | Mod: ,,, | Performed by: INTERNAL MEDICINE

## 2022-05-19 PROCEDURE — 84484 ASSAY OF TROPONIN QUANT: CPT | Performed by: EMERGENCY MEDICINE

## 2022-05-19 PROCEDURE — 81000 URINALYSIS NONAUTO W/SCOPE: CPT | Performed by: EMERGENCY MEDICINE

## 2022-05-19 PROCEDURE — 83690 ASSAY OF LIPASE: CPT | Performed by: EMERGENCY MEDICINE

## 2022-05-19 PROCEDURE — 25000003 PHARM REV CODE 250: Performed by: EMERGENCY MEDICINE

## 2022-05-19 PROCEDURE — 93010 ELECTROCARDIOGRAM REPORT: CPT | Mod: ,,, | Performed by: INTERNAL MEDICINE

## 2022-05-19 PROCEDURE — 83880 ASSAY OF NATRIURETIC PEPTIDE: CPT | Performed by: EMERGENCY MEDICINE

## 2022-05-19 PROCEDURE — 93005 ELECTROCARDIOGRAM TRACING: CPT

## 2022-05-19 PROCEDURE — 80053 COMPREHEN METABOLIC PANEL: CPT | Performed by: EMERGENCY MEDICINE

## 2022-05-19 RX ORDER — SUCRALFATE 1 G/10ML
1 SUSPENSION ORAL 4 TIMES DAILY
Qty: 414 ML | Refills: 0 | Status: SHIPPED | OUTPATIENT
Start: 2022-05-19

## 2022-05-19 RX ORDER — MAG HYDROX/ALUMINUM HYD/SIMETH 200-200-20
30 SUSPENSION, ORAL (FINAL DOSE FORM) ORAL ONCE
Status: COMPLETED | OUTPATIENT
Start: 2022-05-19 | End: 2022-05-19

## 2022-05-19 RX ORDER — LIDOCAINE HYDROCHLORIDE 20 MG/ML
10 SOLUTION OROPHARYNGEAL ONCE
Status: COMPLETED | OUTPATIENT
Start: 2022-05-19 | End: 2022-05-19

## 2022-05-19 RX ADMIN — ALUMINA, MAGNESIA, AND SIMETHICONE ORAL SUSPENSION REGULAR STRENGTH 30 ML: 1200; 1200; 120 SUSPENSION ORAL at 02:05

## 2022-05-19 RX ADMIN — LIDOCAINE HYDROCHLORIDE 10 ML: 20 SOLUTION ORAL; TOPICAL at 02:05

## 2022-05-19 NOTE — DISCHARGE INSTRUCTIONS
Thank you for coming to our Emergency Department today. It is important to remember that some problems are difficult to diagnose and may not be found during your Emergency Department visit. Be sure to follow up with your primary care doctor and review all labs/imaging/tests that were performed during this visit with them. Some labs/tests may be outside of the normal range and require non-emergent follow-up and further investigation to help diagnose/exclude/prevent complications or other medical conditions.    If you do not have a primary care doctor, you may contact the one listed on your discharge paperwork or you may also call the [facility] Appointment Desk at [phone] to schedule an appointment and establish care with one. It is important to your health that you have a primary care doctor.    Please take all medications as directed. All medications may potentially have side-effects and it is impossible to predict which medications may give you side-effects or what side-effects (if any) they will give you.. If you feel that you are having a negative effect or side-effect of any medication you should immediately stop taking them and seek medical attention. If you feel that you are having a life-threatening reaction call 911.    Return to the ER with any questions/concerns, new/concerning symptoms, worsening or failure to improve.     Do not drive, swim, climb to height, take a bath or make any important decisions for 24 hours if you have received any pain medications, sedatives or mood altering drugs during your ER visit.        BELOW THIS LINE ONLY APPLIES IF YOU HAVE A COVID TEST PENDING OR IF YOU HAVE BEEN DIAGNOSED WITH COVID:  Please access [facility] to review the results of your test. Until the results of your COVID test return, you should isolate yourself so as not to potentially spread illness to others.   If your COVID test returns positive, you should isolate yourself so as not to spread  illness to others. After five full days, if you are feeling better and you have not had fever for 24 hours, you can return to your typical daily activities, but you must wear a mask around others for an additional 5 days.   If your COVID test returns negative and you are either unvaccinated or more than six months out from your two-dose vaccine and are not yet boosted, you should still quarantine for 5 full days followed by strict mask use for an additional 5 full days.   If your COVID test returns negative and you have received your 2-dose initial vaccine as well as a booster, you should continue strict mask use for 10 full days after the exposure.  For all those exposed, best practice includes a test at day 5 after the exposure. This can be a home test or a test through one of the many testing centers throughout our community.   Masking is always advised to limit the spread of COVID. Cdc.gov is an excellent site to obtain the latest up to date recommendations regarding COVID and COVID testing.     CDC Testing and Quarantine Guidelines for patients with exposure to a known-positive COVID-19 person:  A close exposure is defined as anyone who has had an exposure (masked or unmasked) to a known COVID -19 positive person within 6 feet of someone for a cumulative total of 15 minutes or more over a 24-hour period.   Vaccinated and/or if you recently had a positive covid test within 90 days do NOT need to quarantine after contact with someone who had COVID-19 unless you develop symptoms.   Fully vaccinated people who have not had a positive test within 90 days, should get tested 3-5 days after their exposure, even if they don't have symptoms and wear a mask indoors in public for 14 days following exposure or until their test result is negative.      Unvaccinated and/or NOT had a positive test within 90 days and meet close exposure  You are required by CDC guidelines to quarantine for at least 5 days from time of  exposure followed by 5 days of strict masking. It is recommended, but not required to test after 5 days, unless you develop symptoms, in which case you should test at that time.  If you get tested after 5 days and your test is positive, your 5 day period of isolation starts the day of the positive test.    If your exposure does not meet the above definition, you can return to your normal daily activities to include social distancing, wearing a mask and frequent handwashing.      Here is a link to guidance from the CDC:  https://www.cdc.gov/media/releases/2021/s1227-isolation-quarantine-guidance.html      [facility]  Health Testing Sites:  https://ldh.la.gov/page/3934      [facility] website with testing locations and guidance:  [email or web address]

## 2022-05-19 NOTE — ED TRIAGE NOTES
"Pt reports sob that onset around 9pm last night. Also reports dizziness. reports abdominal pain. Described as a "nag" Increased belching.  "

## 2022-05-19 NOTE — ED PROVIDER NOTES
Encounter Date: 5/19/2022       History     Chief Complaint   Patient presents with    Shortness of Breath     Pt reports she was discharged from hospital on Friday. She was admitted for CHF excerbation. Pt reports she started to feel SOB again tonight. Pt has O2 sat of 93% on RA. She uses 2L/NC during sleep.      76-year-old female with history of CHF, AICD in place, COPD, CKD (baseline creatinine around 2.2).  presents with shortness of breath, dizziness, and intermittent abdominal pain.      Patient reports her shortness of breath started around 9:00 p.m. this evening.  Feels similar to when she has had fluid buildup.  She endorses some weight gain since hospital discharge and reports doubling her Lasix dose today.  She does use oxygen at home at night.    The patient also complains of intermittent abdominal pain.  She has difficulty describing this abdominal pain, sometimes it is upper, sometimes it is lower.  No associated urinary symptoms, diarrhea, fever, nausea or vomiting.  She reports she had a bowel movement today but states it was green in color.  Abdominal pain has been temporarily relieved with use of Pepto-Bismol at home.    The patient also complains of dizziness tonight.  She states she was laying down all day resting.  She got up and a relative noticed that she appeared to have some difficulty with her gait.  The patient denies any room spinning sensation, visual changes, unilateral numbness or weakness, or speech deficit.    The patient was just admitted from May 10th, she was discharged on May 13th.  At time of admission, her BNP was elevated to 780 and her troponin was mildly elevated at 0.032 and her chest x-ray showed pulmonary edema.  She had an echocardiogram that showed an ejection fraction of 40% with grade 1 diastolic dysfunction.  She was diuresed.  Her creatinine improved during hospitalization.  She was discharged to continue her Lasix and Coreg.        Review of patient's allergies  indicates:   Allergen Reactions    Lotensin hct [benazepril-hydrochlorothiazide] Anaphylaxis     Past Medical History:   Diagnosis Date    Back pain     CHF (congestive heart failure)     Gout, unspecified     HTN (hypertension)     Type 2 diabetes mellitus without complications      No past surgical history on file.  No family history on file.  Social History     Substance Use Topics    Alcohol use: Never     Review of Systems   Constitutional: Negative for chills and fever.   HENT: Negative for congestion and sore throat.    Eyes: Negative for visual disturbance.   Respiratory: Positive for shortness of breath. Negative for cough.    Cardiovascular: Negative for chest pain.   Gastrointestinal: Positive for abdominal pain. Negative for nausea and vomiting.   Genitourinary: Negative for dysuria.   Skin: Negative for rash.   Neurological: Positive for dizziness. Negative for speech difficulty, weakness, numbness and headaches.   Psychiatric/Behavioral: Negative for decreased concentration.       Physical Exam     Initial Vitals [05/19/22 0124]   BP Pulse Resp Temp SpO2   123/73 101 20 97.9 °F (36.6 °C) (!) 93 %      MAP       --         Physical Exam    Nursing note and vitals reviewed.  Constitutional: She appears well-developed and well-nourished. She is not diaphoretic. No distress.   HENT:   Head: Normocephalic and atraumatic.   Mouth/Throat: Oropharynx is clear and moist.   Eyes: Conjunctivae and EOM are normal. Pupils are equal, round, and reactive to light.   Neck: Neck supple.   Cardiovascular: Normal rate and regular rhythm.   Pulmonary/Chest: No respiratory distress. She has rales (Bibasilar rales).   Abdominal: Abdomen is soft. Bowel sounds are normal. She exhibits no distension. There is no abdominal tenderness.   There is no tenderness on abdominal exam   Musculoskeletal:         General: No edema.      Cervical back: Neck supple.     Neurological: She is alert and oriented to person, place, and  time. She has normal strength. No cranial nerve deficit or sensory deficit. GCS score is 15. GCS eye subscore is 4. GCS verbal subscore is 5. GCS motor subscore is 6.   No pronator drift, normal finger-to-nose testing, patient ambulated in ED without dizziness   Skin: Skin is warm and dry.   Psychiatric: She has a normal mood and affect.         ED Course   Procedures  Labs Reviewed   CBC W/ AUTO DIFFERENTIAL - Abnormal; Notable for the following components:       Result Value    RBC 3.72 (*)     Hemoglobin 10.1 (*)     Hematocrit 33.1 (*)     MCHC 30.5 (*)     RDW 17.7 (*)     Platelet Estimate Clumped (*)     All other components within normal limits   COMPREHENSIVE METABOLIC PANEL - Abnormal; Notable for the following components:    Glucose 132 (*)     BUN 56 (*)     Creatinine 2.7 (*)     eGFR if  19 (*)     eGFR if non  17 (*)     All other components within normal limits   B-TYPE NATRIURETIC PEPTIDE - Abnormal; Notable for the following components:     (*)     All other components within normal limits   URINALYSIS, REFLEX TO URINE CULTURE - Abnormal; Notable for the following components:    Color, UA Colorless (*)     Glucose, UA 4+ (*)     All other components within normal limits    Narrative:     Specimen Source->Urine   LIPASE - Abnormal; Notable for the following components:    Lipase 69 (*)     All other components within normal limits   TROPONIN I   URINALYSIS MICROSCOPIC    Narrative:     Specimen Source->Urine     EKG Readings: (Independently Interpreted)   Normal sinus rhythm, rate 99 beats per minute, normal HI interval,  milliseconds, no STEMI.       Imaging Results          X-Ray Chest AP Portable (Final result)  Result time 05/19/22 03:16:40    Final result by Gee Sylvester MD (05/19/22 03:16:40)                 Impression:      Intrathoracic findings as above.      Electronically signed by: Gee Sylvester  Date:    05/19/2022  Time:    03:16              Narrative:    EXAMINATION:  XR CHEST AP PORTABLE    CLINICAL HISTORY:  CHF;    TECHNIQUE:  Single frontal view of the chest was performed.    COMPARISON:  Chest radiograph May 10, 2022    FINDINGS:  Single portable chest view is submitted.  Cardiac pacemaker again noted.  Aortic atherosclerotic change and mild tortuosity noted, the appearance of the cardiomediastinal silhouette is stable.    There is mild prominence of the pulmonary vascular and there is a pattern suggesting mild interstitial and ground-glass infiltrate however the overall appearance is that of improvement when compared to the prior examination.    There is no evidence for pleural effusion and no evidence for pneumothorax.  The osseous structures demonstrate chronic change.                                 Medications   aluminum-magnesium hydroxide-simethicone 200-200-20 mg/5 mL suspension 30 mL (30 mLs Oral Given 5/19/22 0215)     And   LIDOcaine HCl 2% oral solution 10 mL (10 mLs Oral Given 5/19/22 0215)     Medical Decision Making:   Initial Assessment:   76-year-old female with history of CHF, COPD, CKD presents with multiple complaints.  Short of breath since 9:00 p.m., she had an episode of dizziness at home, she has had intermittent abdominal pain.  On exam here, the patient's O2 sats 93% on room air.  She has bibasilar rales, no respiratory distress.  She has a nonfocal neurologic exam.  Her abdomen is soft and nontender at this time.  There is no lower extremity edema or posterior calf tenderness.  Differential includes but not limited to CHF exacerbation, electrolyte disturbance, symptomatic anemia, GERD, UTI.  Do not suspect CVA.  Do not suspect peripheral vertigo.  Workup initiated with labs, chest x-ray, urinalysis.  Will check ambulatory pulse ox.  ED Management:  Patient's workup shows a minimally elevated lipase, she has no abdominal pain on my exam.  Will refer to GI, she is on a PPI, will add sucralfate as well for  possibility of GERD versus gastritis.  Patient is no longer short of breath.  She ambulated in the ED without feeling dizzy or short of breath.  The patient and her son both confirm that she does sometimes panic and thinks this may have caused her to feel out of breath tonight.  I carefully considered but doubt PE as her shortness of breath seems to have resolved.  With regards for CHF, BNP and chest x-ray both with improvement.  Patient is currently taking double her Lasix dose on days that she gains more than 2-3 lb.  I encouraged her to continue this.  She lives in [facility] and plans to return in the next several days.  I will place referral to GI and Cardiology in case she ends up staying in [place] for follow-up.  Patient and her son confirmed they will be willing and able to return to the ER if needed for any new or worsening symptoms.                      Clinical Impression:   Final diagnoses:  [R06.02] Shortness of breath  [R74.8] Elevated lipase (Primary)       This dictation has been generated using M-Modal Fluency Direct dictation; some phonetic errors may occur.      ED Disposition Condition    Discharge Stable        ED Prescriptions     Medication Sig Dispense Start Date End Date Auth. Provider    sucralfate (CARAFATE) 100 mg/mL suspension Take 10 mLs (1 g total) by mouth 4 (four) times daily. 414 mL 5/19/2022  Hilary Erwin MD        Follow-up Information     Follow up With Specialties Details Why Contact Info Additional Information    Jesús Alvarez MD Cardiology   [address]  [address]  [facility] [address]  [phone]       [facility] Gastroenterology   [address]  [facility] [address] [phone] Please park in garage or surface lot and use Medical Office Bldg entrance. Check in at [address]    [facility] Emergency Medicine  As needed, If symptoms worsen [address]  [facility] [address] [phone]             Hilary Erwin MD  05/19/22 [id number]